# Patient Record
Sex: MALE | Race: OTHER | HISPANIC OR LATINO | URBAN - METROPOLITAN AREA
[De-identification: names, ages, dates, MRNs, and addresses within clinical notes are randomized per-mention and may not be internally consistent; named-entity substitution may affect disease eponyms.]

---

## 2022-08-30 ENCOUNTER — INPATIENT (INPATIENT)
Facility: HOSPITAL | Age: 47
LOS: 1 days | Discharge: HOME | DRG: 311 | End: 2022-09-01
Attending: STUDENT IN AN ORGANIZED HEALTH CARE EDUCATION/TRAINING PROGRAM | Admitting: HOSPITALIST
Payer: MEDICAID

## 2022-08-30 VITALS
OXYGEN SATURATION: 97 % | HEIGHT: 65 IN | WEIGHT: 160.06 LBS | DIASTOLIC BLOOD PRESSURE: 88 MMHG | TEMPERATURE: 98 F | HEART RATE: 77 BPM | RESPIRATION RATE: 16 BRPM | SYSTOLIC BLOOD PRESSURE: 142 MMHG

## 2022-08-30 DIAGNOSIS — I21.3 ST ELEVATION (STEMI) MYOCARDIAL INFARCTION OF UNSPECIFIED SITE: ICD-10-CM

## 2022-08-30 DIAGNOSIS — F10.10 ALCOHOL ABUSE, UNCOMPLICATED: ICD-10-CM

## 2022-08-30 DIAGNOSIS — I10 ESSENTIAL (PRIMARY) HYPERTENSION: ICD-10-CM

## 2022-08-30 DIAGNOSIS — R07.9 CHEST PAIN, UNSPECIFIED: ICD-10-CM

## 2022-08-30 DIAGNOSIS — I24.9 ACUTE ISCHEMIC HEART DISEASE, UNSPECIFIED: ICD-10-CM

## 2022-08-30 DIAGNOSIS — E87.2 ACIDOSIS: ICD-10-CM

## 2022-08-30 DIAGNOSIS — Z29.9 ENCOUNTER FOR PROPHYLACTIC MEASURES, UNSPECIFIED: ICD-10-CM

## 2022-08-30 DIAGNOSIS — R74.8 ABNORMAL LEVELS OF OTHER SERUM ENZYMES: ICD-10-CM

## 2022-08-30 DIAGNOSIS — R79.89 OTHER SPECIFIED ABNORMAL FINDINGS OF BLOOD CHEMISTRY: ICD-10-CM

## 2022-08-30 LAB
ALBUMIN SERPL ELPH-MCNC: 4.6 G/DL — SIGNIFICANT CHANGE UP (ref 3.3–5)
ALP SERPL-CCNC: 85 U/L — SIGNIFICANT CHANGE UP (ref 40–120)
ALT FLD-CCNC: 60 U/L — HIGH (ref 10–45)
ANION GAP SERPL CALC-SCNC: 15 MMOL/L — SIGNIFICANT CHANGE UP (ref 5–17)
APTT BLD: 28.2 SEC — SIGNIFICANT CHANGE UP (ref 27.5–35.5)
APTT BLD: 45.6 SEC — HIGH (ref 27.5–35.5)
APTT BLD: 47.4 SEC — HIGH (ref 27.5–35.5)
AST SERPL-CCNC: 55 U/L — HIGH (ref 10–40)
BASE EXCESS BLDV CALC-SCNC: 9.5 MMOL/L — HIGH (ref -2–3)
BASOPHILS # BLD AUTO: 0.04 K/UL — SIGNIFICANT CHANGE UP (ref 0–0.2)
BASOPHILS NFR BLD AUTO: 0.5 % — SIGNIFICANT CHANGE UP (ref 0–2)
BILIRUB SERPL-MCNC: 0.9 MG/DL — SIGNIFICANT CHANGE UP (ref 0.2–1.2)
BLD GP AB SCN SERPL QL: NEGATIVE — SIGNIFICANT CHANGE UP
BUN SERPL-MCNC: 19 MG/DL — SIGNIFICANT CHANGE UP (ref 7–23)
CA-I SERPL-SCNC: 1.18 MMOL/L — SIGNIFICANT CHANGE UP (ref 1.15–1.33)
CALCIUM SERPL-MCNC: 9.8 MG/DL — SIGNIFICANT CHANGE UP (ref 8.4–10.5)
CHLORIDE BLDV-SCNC: 95 MMOL/L — LOW (ref 96–108)
CHLORIDE SERPL-SCNC: 93 MMOL/L — LOW (ref 96–108)
CK MB BLD-MCNC: 1.1 % — SIGNIFICANT CHANGE UP (ref 0–3.5)
CK MB CFR SERPL CALC: 1.6 NG/ML — SIGNIFICANT CHANGE UP (ref 0–6.7)
CK SERPL-CCNC: 144 U/L — SIGNIFICANT CHANGE UP (ref 30–200)
CO2 BLDV-SCNC: 38 MMOL/L — HIGH (ref 22–26)
CO2 SERPL-SCNC: 30 MMOL/L — SIGNIFICANT CHANGE UP (ref 22–31)
CREAT SERPL-MCNC: 2.19 MG/DL — HIGH (ref 0.5–1.3)
EGFR: 37 ML/MIN/1.73M2 — LOW
EOSINOPHIL # BLD AUTO: 0.12 K/UL — SIGNIFICANT CHANGE UP (ref 0–0.5)
EOSINOPHIL NFR BLD AUTO: 1.4 % — SIGNIFICANT CHANGE UP (ref 0–6)
GAS PNL BLDV: 135 MMOL/L — LOW (ref 136–145)
GAS PNL BLDV: SIGNIFICANT CHANGE UP
GLUCOSE BLDV-MCNC: 115 MG/DL — HIGH (ref 70–99)
GLUCOSE SERPL-MCNC: 112 MG/DL — HIGH (ref 70–99)
HCO3 BLDV-SCNC: 36 MMOL/L — HIGH (ref 22–29)
HCT VFR BLD CALC: 40.9 % — SIGNIFICANT CHANGE UP (ref 39–50)
HCT VFR BLD CALC: 45.7 % — SIGNIFICANT CHANGE UP (ref 39–50)
HCT VFR BLDA CALC: 45 % — SIGNIFICANT CHANGE UP (ref 39–51)
HGB BLD CALC-MCNC: 15.1 G/DL — SIGNIFICANT CHANGE UP (ref 12.6–17.4)
HGB BLD-MCNC: 13.6 G/DL — SIGNIFICANT CHANGE UP (ref 13–17)
HGB BLD-MCNC: 15.2 G/DL — SIGNIFICANT CHANGE UP (ref 13–17)
IMM GRANULOCYTES NFR BLD AUTO: 0.4 % — SIGNIFICANT CHANGE UP (ref 0–1.5)
INR BLD: 1.04 RATIO — SIGNIFICANT CHANGE UP (ref 0.88–1.16)
LACTATE BLDV-MCNC: 3.4 MMOL/L — HIGH (ref 0.5–2)
LACTATE SERPL-SCNC: 1.2 MMOL/L — SIGNIFICANT CHANGE UP (ref 0.5–2)
LIDOCAIN IGE QN: 51 U/L — SIGNIFICANT CHANGE UP (ref 7–60)
LYMPHOCYTES # BLD AUTO: 2.7 K/UL — SIGNIFICANT CHANGE UP (ref 1–3.3)
LYMPHOCYTES # BLD AUTO: 31.6 % — SIGNIFICANT CHANGE UP (ref 13–44)
MAGNESIUM SERPL-MCNC: 2.4 MG/DL — SIGNIFICANT CHANGE UP (ref 1.6–2.6)
MCHC RBC-ENTMCNC: 30.4 PG — SIGNIFICANT CHANGE UP (ref 27–34)
MCHC RBC-ENTMCNC: 30.6 PG — SIGNIFICANT CHANGE UP (ref 27–34)
MCHC RBC-ENTMCNC: 33.3 GM/DL — SIGNIFICANT CHANGE UP (ref 32–36)
MCHC RBC-ENTMCNC: 33.3 GM/DL — SIGNIFICANT CHANGE UP (ref 32–36)
MCV RBC AUTO: 91.3 FL — SIGNIFICANT CHANGE UP (ref 80–100)
MCV RBC AUTO: 92 FL — SIGNIFICANT CHANGE UP (ref 80–100)
MONOCYTES # BLD AUTO: 1.04 K/UL — HIGH (ref 0–0.9)
MONOCYTES NFR BLD AUTO: 12.2 % — SIGNIFICANT CHANGE UP (ref 2–14)
NEUTROPHILS # BLD AUTO: 4.62 K/UL — SIGNIFICANT CHANGE UP (ref 1.8–7.4)
NEUTROPHILS NFR BLD AUTO: 53.9 % — SIGNIFICANT CHANGE UP (ref 43–77)
NRBC # BLD: 0 /100 WBCS — SIGNIFICANT CHANGE UP (ref 0–0)
NRBC # BLD: 0 /100 WBCS — SIGNIFICANT CHANGE UP (ref 0–0)
NT-PROBNP SERPL-SCNC: 110 PG/ML — SIGNIFICANT CHANGE UP (ref 0–300)
PCO2 BLDV: 56 MMHG — HIGH (ref 42–55)
PH BLDV: 7.42 — SIGNIFICANT CHANGE UP (ref 7.32–7.43)
PHOSPHATE SERPL-MCNC: 4.9 MG/DL — HIGH (ref 2.5–4.5)
PLATELET # BLD AUTO: 190 K/UL — SIGNIFICANT CHANGE UP (ref 150–400)
PLATELET # BLD AUTO: 236 K/UL — SIGNIFICANT CHANGE UP (ref 150–400)
PO2 BLDV: 37 MMHG — SIGNIFICANT CHANGE UP (ref 25–45)
POTASSIUM BLDV-SCNC: 3.2 MMOL/L — LOW (ref 3.5–5.1)
POTASSIUM SERPL-MCNC: 3.6 MMOL/L — SIGNIFICANT CHANGE UP (ref 3.5–5.3)
POTASSIUM SERPL-SCNC: 3.6 MMOL/L — SIGNIFICANT CHANGE UP (ref 3.5–5.3)
PROT SERPL-MCNC: 7.9 G/DL — SIGNIFICANT CHANGE UP (ref 6–8.3)
PROTHROM AB SERPL-ACNC: 12.1 SEC — SIGNIFICANT CHANGE UP (ref 10.5–13.4)
RBC # BLD: 4.48 M/UL — SIGNIFICANT CHANGE UP (ref 4.2–5.8)
RBC # BLD: 4.97 M/UL — SIGNIFICANT CHANGE UP (ref 4.2–5.8)
RBC # FLD: 13.1 % — SIGNIFICANT CHANGE UP (ref 10.3–14.5)
RBC # FLD: 13.1 % — SIGNIFICANT CHANGE UP (ref 10.3–14.5)
RH IG SCN BLD-IMP: POSITIVE — SIGNIFICANT CHANGE UP
SAO2 % BLDV: 57.8 % — LOW (ref 67–88)
SARS-COV-2 RNA SPEC QL NAA+PROBE: SIGNIFICANT CHANGE UP
SODIUM SERPL-SCNC: 138 MMOL/L — SIGNIFICANT CHANGE UP (ref 135–145)
TROPONIN T, HIGH SENSITIVITY RESULT: 16 NG/L — SIGNIFICANT CHANGE UP (ref 0–51)
TROPONIN T, HIGH SENSITIVITY RESULT: 17 NG/L — SIGNIFICANT CHANGE UP (ref 0–51)
WBC # BLD: 6.98 K/UL — SIGNIFICANT CHANGE UP (ref 3.8–10.5)
WBC # BLD: 8.55 K/UL — SIGNIFICANT CHANGE UP (ref 3.8–10.5)
WBC # FLD AUTO: 6.98 K/UL — SIGNIFICANT CHANGE UP (ref 3.8–10.5)
WBC # FLD AUTO: 8.55 K/UL — SIGNIFICANT CHANGE UP (ref 3.8–10.5)

## 2022-08-30 PROCEDURE — 99221 1ST HOSP IP/OBS SF/LOW 40: CPT | Mod: GC

## 2022-08-30 PROCEDURE — 99291 CRITICAL CARE FIRST HOUR: CPT

## 2022-08-30 PROCEDURE — 93306 TTE W/DOPPLER COMPLETE: CPT | Mod: 26

## 2022-08-30 PROCEDURE — 93356 MYOCRD STRAIN IMG SPCKL TRCK: CPT

## 2022-08-30 PROCEDURE — 99223 1ST HOSP IP/OBS HIGH 75: CPT | Mod: GC

## 2022-08-30 PROCEDURE — 71045 X-RAY EXAM CHEST 1 VIEW: CPT | Mod: 26

## 2022-08-30 PROCEDURE — 76700 US EXAM ABDOM COMPLETE: CPT | Mod: 26

## 2022-08-30 RX ORDER — HEPARIN SODIUM 5000 [USP'U]/ML
INJECTION INTRAVENOUS; SUBCUTANEOUS
Qty: 25000 | Refills: 0 | Status: DISCONTINUED | OUTPATIENT
Start: 2022-08-30 | End: 2022-08-31

## 2022-08-30 RX ORDER — HEPARIN SODIUM 5000 [USP'U]/ML
4000 INJECTION INTRAVENOUS; SUBCUTANEOUS EVERY 6 HOURS
Refills: 0 | Status: DISCONTINUED | OUTPATIENT
Start: 2022-08-30 | End: 2022-08-31

## 2022-08-30 RX ORDER — HEPARIN SODIUM 5000 [USP'U]/ML
5000 INJECTION INTRAVENOUS; SUBCUTANEOUS ONCE
Refills: 0 | Status: COMPLETED | OUTPATIENT
Start: 2022-08-30 | End: 2022-08-30

## 2022-08-30 RX ORDER — TICAGRELOR 90 MG/1
180 TABLET ORAL ONCE
Refills: 0 | Status: COMPLETED | OUTPATIENT
Start: 2022-08-30 | End: 2022-08-30

## 2022-08-30 RX ORDER — ASPIRIN/CALCIUM CARB/MAGNESIUM 324 MG
81 TABLET ORAL DAILY
Refills: 0 | Status: DISCONTINUED | OUTPATIENT
Start: 2022-08-30 | End: 2022-09-01

## 2022-08-30 RX ORDER — KETOROLAC TROMETHAMINE 30 MG/ML
15 SYRINGE (ML) INJECTION ONCE
Refills: 0 | Status: DISCONTINUED | OUTPATIENT
Start: 2022-08-30 | End: 2022-08-30

## 2022-08-30 RX ORDER — TICAGRELOR 90 MG/1
90 TABLET ORAL EVERY 12 HOURS
Refills: 0 | Status: DISCONTINUED | OUTPATIENT
Start: 2022-08-30 | End: 2022-08-31

## 2022-08-30 RX ORDER — SODIUM CHLORIDE 9 MG/ML
1000 INJECTION INTRAMUSCULAR; INTRAVENOUS; SUBCUTANEOUS ONCE
Refills: 0 | Status: DISCONTINUED | OUTPATIENT
Start: 2022-08-30 | End: 2022-08-30

## 2022-08-30 RX ORDER — ASPIRIN/CALCIUM CARB/MAGNESIUM 324 MG
324 TABLET ORAL ONCE
Refills: 0 | Status: COMPLETED | OUTPATIENT
Start: 2022-08-30 | End: 2022-08-30

## 2022-08-30 RX ORDER — ATORVASTATIN CALCIUM 80 MG/1
80 TABLET, FILM COATED ORAL AT BEDTIME
Refills: 0 | Status: DISCONTINUED | OUTPATIENT
Start: 2022-08-30 | End: 2022-09-01

## 2022-08-30 RX ORDER — ONDANSETRON 8 MG/1
4 TABLET, FILM COATED ORAL ONCE
Refills: 0 | Status: DISCONTINUED | OUTPATIENT
Start: 2022-08-30 | End: 2022-08-30

## 2022-08-30 RX ORDER — HEPARIN SODIUM 5000 [USP'U]/ML
4000 INJECTION INTRAVENOUS; SUBCUTANEOUS ONCE
Refills: 0 | Status: DISCONTINUED | OUTPATIENT
Start: 2022-08-30 | End: 2022-08-30

## 2022-08-30 RX ADMIN — HEPARIN SODIUM 1300 UNIT(S)/HR: 5000 INJECTION INTRAVENOUS; SUBCUTANEOUS at 23:14

## 2022-08-30 RX ADMIN — ATORVASTATIN CALCIUM 80 MILLIGRAM(S): 80 TABLET, FILM COATED ORAL at 21:47

## 2022-08-30 RX ADMIN — TICAGRELOR 90 MILLIGRAM(S): 90 TABLET ORAL at 18:41

## 2022-08-30 RX ADMIN — HEPARIN SODIUM 5000 UNIT(S): 5000 INJECTION INTRAVENOUS; SUBCUTANEOUS at 07:23

## 2022-08-30 RX ADMIN — Medication 324 MILLIGRAM(S): at 06:46

## 2022-08-30 RX ADMIN — HEPARIN SODIUM 1150 UNIT(S)/HR: 5000 INJECTION INTRAVENOUS; SUBCUTANEOUS at 15:41

## 2022-08-30 RX ADMIN — TICAGRELOR 180 MILLIGRAM(S): 90 TABLET ORAL at 07:23

## 2022-08-30 RX ADMIN — Medication 81 MILLIGRAM(S): at 18:41

## 2022-08-30 RX ADMIN — HEPARIN SODIUM 1000 UNIT(S)/HR: 5000 INJECTION INTRAVENOUS; SUBCUTANEOUS at 08:20

## 2022-08-30 NOTE — H&P ADULT - NSHPLABSRESULTS_GEN_ALL_CORE
Personally reviewed labs, imaging, ekg                           15.2   8.55  )-----------( 236      ( 30 Aug 2022 06:51 )             45.7       08-30    138  |  93<L>  |  19  ----------------------------<  112<H>  3.6   |  30  |  2.19<H>    Ca    9.8      30 Aug 2022 06:51  Phos  4.9     08-30  Mg     2.4     08-30    TPro  7.9  /  Alb  4.6  /  TBili  0.9  /  DBili  x   /  AST  55<H>  /  ALT  60<H>  /  AlkPhos  85  08-30                  PT/INR - ( 30 Aug 2022 07:05 )   PT: 12.1 sec;   INR: 1.04 ratio         PTT - ( 30 Aug 2022 07:05 )  PTT:28.2 sec    Lactate Trend      CARDIAC MARKERS ( 30 Aug 2022 06:52 )  x     / x     / 144 U/L / x     / 1.6 ng/mL        CAPILLARY BLOOD GLUCOSE                Personal interpretation EKG:

## 2022-08-30 NOTE — H&P ADULT - ATTENDING COMMENTS
47M w/pmh HTN p/w chest pain. No STEMI on EKG, however will treat as NSTEMI. Cont heparin drip, aspirin, brilinta. F/u TTE. Cardiology recs appreciated. Also found to have AMY, unclear etiology. Will check urine studies and trend Cr.     Incidentally found to have hepatic steatosis, possibly cirrhosis. This is likely due to alcohol use - advised patient he needs to stop drinking to prevent further worsening of liver disease.

## 2022-08-30 NOTE — ED PROVIDER NOTE - PROGRESS NOTE DETAILS
Cardiology at bedside evaluating patient pettet attending- Discussed with cardiology will evaluate patient emergently wait for heparin drip aspirin given labs pending Cee Miranda, PGY-2, EM:  Per cards, they are unconvinced it is a STEMI. Said rpt ecg in 10 min and brillinta load. Cee Miranda, PGY-2, EM:  Per cards, they are unconvinced it is a STEMI. Said rpt ecg in 10 min and brillinta load. not cath candidate at this time. recc tele admission follow labs, cards fellow recc tte Lida Hopson PGY2: I received sign out on this patient. I have reassessed patient and agree with the current plan. Will follow-up labs/imaging. Patient got 2nd EKG also shows STEMI. Awaiting finally cards recs then patient TBA. Meds started. Lida Hopson PGY2: I received sign out on this patient. I have reassessed patient and agree with the current plan. Will follow-up labs/imaging. Patient got 2nd EKG also shows STEMI. Awaiting finally cards recs then patient TBA. Meds started. Mild bump in LFT, Cr 2.19 (no old in system), will get RUQ ultrasound. Patient TBA to medicine tele.

## 2022-08-30 NOTE — H&P ADULT - NSHPREVIEWOFSYSTEMS_GEN_ALL_CORE
Review of Systems:     CONSTITUTIONAL: No fever, weight loss  EYES: No eye pain, visual disturbances, or discharge  ENMT:  No difficulty hearing, tinnitus, vertigo; No sinus or throat pain  RESPIRATORY: No SOB. No cough, wheezing, chills or hemoptysis  CARDIOVASCULAR: +chest pain, palpitations, dizziness, or leg swelling  GASTROINTESTINAL: No abdominal or epigastric pain. +nausea, vomiting, or hematemesis; No diarrhea or constipation. No melena or hematochezia.  GENITOURINARY: No dysuria, frequency, hematuria, or incontinence  NEUROLOGICAL: No headaches, memory loss, loss of strength, numbness, or tremors  SKIN: No itching, burning, rashes, or lesions   LYMPH NODES: No enlarged glands  ENDOCRINE: No heat or cold intolerance; No hair loss  MUSCULOSKELETAL: No joint pain or swelling; No muscle, back pain  PSYCHIATRIC: No depression, anxiety, mood swings, or difficulty sleeping  HEME/LYMPH: No easy bruising, or bleeding gums Review of Systems:     CONSTITUTIONAL: No fever, weight loss  EYES: No eye pain, visual disturbances, or discharge  ENMT:  No difficulty hearing, tinnitus, vertigo; No sinus or throat pain  RESPIRATORY: No SOB. No cough, wheezing, chills or hemoptysis  CARDIOVASCULAR: +chest pain, palpitations, dizziness, or leg swelling  GASTROINTESTINAL: No abdominal or epigastric pain. +nausea, vomiting, no hematemesis; No diarrhea or constipation. No melena or hematochezia.  GENITOURINARY: No dysuria, frequency, hematuria, or incontinence  NEUROLOGICAL: No headaches, memory loss, loss of strength, numbness, or tremors  SKIN: No itching, burning, rashes, or lesions   LYMPH NODES: No enlarged glands  ENDOCRINE: No heat or cold intolerance; No hair loss  MUSCULOSKELETAL: No joint pain or swelling; No muscle, back pain  PSYCHIATRIC: No depression, anxiety, mood swings, or difficulty sleeping  HEME/LYMPH: No easy bruising, or bleeding gums

## 2022-08-30 NOTE — H&P ADULT - PROBLEM SELECTOR PLAN 1
Patient with typical chest pain at rest, unstable angina  Trop in ED 21 --> 17   Given ASA loading dose with CP relief, currently pain free Patient with typical chest pain at rest, unstable angina  Trop in ED 21 --> 17   Given ASA loading dose with CP relief, currently pain free  - c/w ASA 81mg   - c/w brilinta 90mg BID   - c/w heparin ggt  - cardiology following   - pending TTE ACS rule out   Patient with typical chest pain at rest, unstable angina  Per cards, EKG with LVH, ST-elevation in v1-3 consistent with early repolarization, ST-depressions in I, aVL, v5-6  Trop in ED 21 --> 17 --> 16   Given ASA loading dose with CP relief, currently pain free  - c/w ASA 81mg   - c/w brilinta 90mg BID   - c/w heparin ggt  - cardiology following   - pending TTE

## 2022-08-30 NOTE — H&P ADULT - NSICDXFAMILYHX_GEN_ALL_CORE_FT
FAMILY HISTORY:  Mother  Still living? Unknown  Family history of stroke, Age at diagnosis: Age Unknown    Sibling  Still living? Unknown  Family history of stroke, Age at diagnosis: Age Unknown

## 2022-08-30 NOTE — H&P ADULT - PROBLEM SELECTOR PLAN 5
VTE ppx: heparin ggt Cr on BMP 2.19  BUN: Cr = 8.7, do not have a baseline Cr to determine a baseline. Pattern suggests intra-renal pathology   - monitor following BMP   - avoid nephrotoxic agents   - can order urine creatinine and sodium if not improving LA on VBG is 3.4 LA on VBG is 3.4  Patient has not been hypotensive, perfusing well with strong distal pulses   - will order stat lactate and check again AM

## 2022-08-30 NOTE — CONSULT NOTE ADULT - SUBJECTIVE AND OBJECTIVE BOX
CARDIOLOGY CONSULT INITIAL EVALUATION  NOLAN MOTA  MRN-[id number]    CONSULTING SERVICE: Emergency Medicine  CONSULT QUESTION: Chest pain    HPI:  47M w/ hx of HTN, presented with chest pain. He reports for last 3-4 years has had intermittent chest pain with exertion that improves with rest. The chest pain was located in his L and R chest, non-radiating. For last 2-3 days he has had nausea with non-bloody emesis. On day of presentation he started having the same chest pain at approximately 3pm however it persisted even at rest. Due to the pain he presented to the ED for further care. Denies associated sob, cough, palpitations, syncope.    In ED pt received aspirin load, reports chest pain improving. After initial EKG with possible HERMINIO, STEMI activated. After evaluation (see separate STEMI note), EKG changes did not meet criteria for STEMI, cath lab not activated emergently. After aspirin load, brilinta load, and hep gtt, pt reported chest pain completely resolved.    ROS:  Negative, except as above.    PAST MEDICAL & SURGICAL HISTORY:  Hypertension        Prescriptions:    Home Medications:    MEDICATIONS  (STANDING):  heparin  Infusion.  Unit(s)/Hr (10 mL/Hr) IV Continuous <Continuous>    MEDICATIONS  (PRN):  heparin   Injectable 4000 Unit(s) IV Push every 6 hours PRN For aPTT less than 40    Allergies    No Known Allergies    Intolerances      FAMILY HISTORY:    Social History:    P/E:  Vital Signs Last 24 Hrs  T(C): 36.7 (30 Aug 2022 07:37), Max: 36.9 (30 Aug 2022 00:26)  T(F): 98.1 (30 Aug 2022 07:37), Max: 98.4 (30 Aug 2022 00:26)  HR: 57 (30 Aug 2022 07:37) (57 - 77)  BP: 108/66 (30 Aug 2022 07:37) (108/66 - 142/88)  BP(mean): --  RR: 18 (30 Aug 2022 07:37) (16 - 18)  SpO2: 97% (30 Aug 2022 07:37) (97% - 98%)    Parameters below as of 30 Aug 2022 07:37  Patient On (Oxygen Delivery Method): room air      Daily Height in cm: 165.1 (30 Aug 2022 00:26)    Daily   I&O's Summary    - gen: well appearing, laying in hospital bed, NAD  - HEENT: MMM, NCAT  - neck: no JVD, supple  - heart: RRR, nml S1/S2, no m/r/g  - lungs: CTABL, nml WOB, no w/c/r  - abd: soft, NTND, no r/g  - ext: WWP, no LE edema b/l    RELEVANT RECENT LABS/IMAGING/STUDIES:                        15.2   8.55  )-----------( 236      ( 30 Aug 2022 06:51 )             45.7     08-30    138  |  93<L>  |  19  ----------------------------<  112<H>  3.6   |  30  |  2.19<H>    Ca    9.8      30 Aug 2022 06:51  Phos  4.9     08-30  Mg     2.4     08-30    TPro  7.9  /  Alb  4.6  /  TBili  0.9  /  DBili  x   /  AST  55<H>  /  ALT  60<H>  /  AlkPhos  85  08-30    LIVER FUNCTIONS - ( 30 Aug 2022 06:51 )  Alb: 4.6 g/dL / Pro: 7.9 g/dL / ALK PHOS: 85 U/L / ALT: 60 U/L / AST: 55 U/L / GGT: x           PT/INR - ( 30 Aug 2022 07:05 )   PT: 12.1 sec;   INR: 1.04 ratio         PTT - ( 30 Aug 2022 07:05 )  PTT:28.2 sec    Troponin: 21    --------------------------------------  EK/30 6:24am: NSR, normal axis, QTc 480, J point elevation in V1-3 with concave-up ST changes

## 2022-08-30 NOTE — ED ADULT NURSE REASSESSMENT NOTE - NS ED NURSE REASSESS COMMENT FT1
Received pt on stretcher awake. Pt is OAx4. breathing unlabored on RA symmetrical rise and fall of the chest. Skin is warm and dry to touch. Pt states that he feels ok no pain. Will continue to monitor.

## 2022-08-30 NOTE — ED PROVIDER NOTE - PHYSICAL EXAMINATION
Physical Exam:  Gen: NAD, AOx3, non-toxic appearing  Head: normal appearing  HEENT: normal conjunctiva, oral mucosa moist  Lung:  no respiratory distress, speaking in full sentences, clear to ascultation bilaterally     CV: regular rate and rhythm   Abd: soft, mildly distended, NT  MSK: no visible deformities  Neuro: No focal deficits  Skin: Warm  Psych: normal affect  ~Tomer Katz D.O. -ED Attending

## 2022-08-30 NOTE — ED PROVIDER NOTE - ATTENDING CONTRIBUTION TO CARE
I, Tomer Katz, performed a history and physical exam of the patient and discussed their management with the resident and/or advanced care provider. I reviewed the resident and/or advanced care provider's note and agree with the documented findings and plan of care. I was present and available for all procedures.    See my full note for details

## 2022-08-30 NOTE — H&P ADULT - PROBLEM SELECTOR PLAN 6
VTE ppx: heparin ggt Binge drinking at home 12 beers, last drink 5 days ago   Patient denies hx of withdrawal, seizures  Outside of withdrawal window, will hold off on CIWA  - SBIRT consult

## 2022-08-30 NOTE — H&P ADULT - PROBLEM SELECTOR PLAN 4
AST 55, ALT 60  U/S abd 8/30: Hepatic steatosis, possible cirrhosis, no evidence of gallbladder or biliary disease  Patient w/o thrombocytopenia, coagulopathy, jaundice, volume overload AST 55, ALT 60  U/S abd 8/30: Hepatic steatosis, possible cirrhosis, no evidence of gallbladder or biliary disease  May be secondary to alcoholic liver disease   Patient w/o thrombocytopenia, coagulopathy, jaundice, volume overload  - follow up CMP Cr on BMP 2.19  BUN: Cr = 8.7, do not have a baseline Cr to determine a baseline. Pattern suggests intra-renal pathology.   - monitor following BMP   - avoid nephrotoxic agents   - f/u urine creatinine and sodium if not improving Cr on BMP 2.19  BUN: Cr = 8.7, do not have a baseline Cr to determine a baseline. Pattern suggests intra-renal pathology.   - monitor Cr following BMP   - avoid nephrotoxic agents   - f/u urine creatinine and sodium

## 2022-08-30 NOTE — H&P ADULT - NSHPPHYSICALEXAM_GEN_ALL_CORE
LOS:     VITALS:   T(C): 36.7 (08-30-22 @ 10:36), Max: 36.9 (08-30-22 @ 00:26)  HR: 66 (08-30-22 @ 10:36) (57 - 77)  BP: 125/72 (08-30-22 @ 10:36) (108/66 - 142/88)  RR: 18 (08-30-22 @ 10:36) (16 - 18)  SpO2: 97% (08-30-22 @ 10:36) (97% - 98%)    GENERAL: NAD, lying in bed comfortably  HEAD:  Atraumatic, Normocephalic  EYES: EOMI, PERRLA, conjunctiva and sclera clear  ENT: Moist mucous membranes  NECK: Supple, No JVD  CHEST/LUNG: Clear to auscultation bilaterally; No rales, rhonchi, wheezing, or rubs. Unlabored respirations  HEART: Regular rate and rhythm; No murmurs, rubs, or gallops  ABDOMEN: BSx4; Soft, nontender, nondistended  EXTREMITIES:  2+ Peripheral Pulses, brisk capillary refill. No clubbing, cyanosis, or edema  NERVOUS SYSTEM:  A&Ox3, no focal deficits   SKIN: No rashes or lesions

## 2022-08-30 NOTE — ED PROVIDER NOTE - OBJECTIVE STATEMENT
desmond attending- 47-year-old male past medical history of hypertension and alcohol abuse presents with chest pain abdominal pain.  Reports having abdominal pain nausea for last day reports pain feels like gnawing pain in chest.  Denies drug or alcohol use recently denies withdrawal symptoms.  Denies cocaine use denies recent travel or chest wall trauma.  Denies shortness of breath has not taken aspirin today

## 2022-08-30 NOTE — CONSULT NOTE ADULT - ATTENDING COMMENTS
The patient was seen and examined with the Cardiology Consultation Teaching Service.   Evaluated using a [place] telephone     He is a 47-year-old man with hypertension who presented after experiencing persistent chest pain on the day of admission.    The patient reports that he has had intermittent chest pain, particularly with heavy lifting for the last several years. Several days ago, he started to experiencing nausea and vomiting, and developed more persistent chest pain that did not subside.    No dyspnea, orthopnea or PND  No palpitations or dizziness    Comfortable-appearing man in no acute distress  Alert and oriented  Afebrile  Vital signs stable  JVP is not elevated  No carotid bruits  Clear lungs  Normal heart sounds  Soft, non-tender, non-distended abdomen  Extremities are warm and perfused  No peripheral edema     Normal blood counts  Normal coagulation  GFR 37  Mild transaminitis    Hs-troponin 21    Family history of CVA; no history of premature coronary disease    Previously worked as a cook in a restaurant  Non-smoker  Drinks >2 drinks although no every day  Denies drug use    ECG demonstrates sinus rhythm, LVH, ST-elevation in v1-3 consistent with early repolarization, ST-depressions in I, aVL, v5-6  CXR demonstrates clear lungs    Impression and Recommendations:  47-year-old man with hypertension who presented after experiencing persistent chest pain on the day of admission.    The patient's ongoing chest pain for the last several years is suggestive of a stable angina syndrome, which increases my suspicion for an acute event. The ECG is consistent with LVH and secondary ST changes, although this does not exclude the presence of ischemia. Initial cardiac biomarkers are unremarkable.     Given the unclear clinical picture and his low risk for bleeding complications, treatment for acute coronary syndrome with dual antiplatelet therapy and unfractionated heparin was initiated and should be continued.    Please obtain echocardiography and continue to trend cardiac biomarkers.  Start high-dose high-potency statin.     If the patient's cardiac biomarkers remain normal and his echocardiography is unremarkable, will likely plan for further non-invasive testing.     Dre Demarco MD  Cardiology  [phone]

## 2022-08-30 NOTE — CHART NOTE - NSCHARTNOTEFT_GEN_A_CORE
Registration Time: per ED notes  Initial EC:24AM  Called by ED: 6:31AM  Saw patient at bedside: 6:36AM  Called Cath Attendin:45AM    HPI:  47M w/ hx of HTN, presented with chest pain. He reports for last 3-4 years has had intermittent chest pain with exertion that improves with rest. The chest pain was located in his L and R chest, non-radiating. For last 2-3 days he has had nausea with non-bloody emesis. On day of presentation he started having the same chest pain at approximately 3pm however it persisted even at rest. Due to the pain he presented to the ED for further care. Denies associated sob, cough, palpitations, syncope.    In ED pt received aspirin load, reports chest pain improving.    PMHx:   Hypertension        PSHx:       Home Meds:    Current Meds:   heparin   Injectable 4000 Unit(s) IV Push every 6 hours PRN  heparin   Injectable 5000 Unit(s) IV Push once  heparin  Infusion.  Unit(s)/Hr IV Continuous <Continuous>  ticagrelor 180 milliGRAM(s) Oral Once      Allergies:  No Known Allergies      FAMILY HISTORY:      Social History:  Smoking History:  Alcohol Use:  Drug Use:    All other review of systems is negative unless indicated above.    Physical Exam:  T(F): 98.4 (08-30), Max: 98.4 (08-30)  HR: 65 (08-30) (65 - 77)  BP: 136/91 (08-30) (136/91 - 142/88)  RR: 18 (08-30)  SpO2: 98% (08-30)  GENERAL: No acute distress, well-developed  HEAD:  Atraumatic, Normocephalic  ENT: EOMI, No JVD, moist mucosa  CHEST/LUNG: Clear to auscultation bilaterally; No wheeze, equal breath sounds bilaterally   HEART: Regular rate and rhythm; No murmurs, rubs, or gallops  ABDOMEN: Soft, Nontender, Nondistended; Bowel sounds present  EXTREMITIES:  No clubbing, cyanosis, or edema      ECG: Personally reviewed      Labs: Pending    A/P:  Discussed the patient's presentation and presenting EKG with interventional on-call attending. Currently the EKG does not meet STEMI criteria, recommend empiric ACS treatment with brilinta load, hep gtt ACS protocol, full labs including troponin, repeating EKG in 10 minutes, TTE. Cardiology consulting service will continue to follow.

## 2022-08-30 NOTE — H&P ADULT - ASSESSMENT
Patient is a 48 y/o M with PMH HTN who presents with chest pain, treated medically for ACS per cardiology.  Patient is a 46 y/o M with PMH HTN, former smoker, and alcohol misuse who presents with chest pain, treated medically for ACS per cardiology.  Patient is a 48 y/o M with PMH HTN, former smoker, alcohol misuse, and stable angina who presents with chest pain persistent at rest, treated medically for ACS per cardiology.

## 2022-08-30 NOTE — ED ADULT NURSE REASSESSMENT NOTE - NS ED NURSE REASSESS COMMENT FT1
voided in br and returned to bed; heparin gtt at 11.5 cc per hour via pump to peripheral site; no c/o cp or nausea; tele box # 24 in place; skin warm and dry; no acute distress; await bed assignment.

## 2022-08-30 NOTE — ED PROVIDER NOTE - CLINICAL SUMMARY MEDICAL DECISION MAKING FREE TEXT BOX
pettet attending 46yo Male presenting with chest pain and abdominal pain concern for ACS versus intra-abdominal etiology but without abdominal tenderness to palpation will work-up with screening labs troponin EKG chest x-ray as well as antiemetics possible abdominal imaging if remains with pain despite a normal exam.  EKG completed during my assessment showing now a STEMI anteriorly, aspirin given cardiology fellow consulted will emergently evaluate patient

## 2022-08-30 NOTE — H&P ADULT - PROBLEM SELECTOR PLAN 2
BP 140s systolic BP 140s systolic  - unable to obtain med rec BP 140s systolic  BP currently 110-120s, can hold off for now   - unable to obtain med rec as spouse is at work   - will contact tomorrow AM

## 2022-08-30 NOTE — CONSULT NOTE ADULT - ASSESSMENT
47M w/ hx of HTN, several years of chronic intermittent typical chest pain, presenting with an acute episode of chest pain. S/p STEMI call, discussed with on-call interventional cardiologist, EKG does not meet STEMI criteria. Given risk factors and typical nature of chest pain, will treat empirically for type 1 NSTEMI and obtain further work-up with labs and TTE. Pt currently chest pain free and HDS.    Recs:  - s/p aspirin load, brilinta load  - continue hep gtt  - continue aspirin 81mg daily  - continue brilinta 90mg BID  - start atorvastatin 80mg daily  - check A1c, lipids  - TTE  - telemetry  - obtain second troponin for trend    Dagoberto Quiles MD  Cardiology Fellow - PGY4    For all New Consults and Questions:  [email or web address]   Login: cardfellows    *** Recommendations are preliminary until cosigned by the attending. 47M w/ hx of HTN, several years of chronic intermittent typical chest pain, presenting with an acute episode of chest pain. S/p STEMI call, discussed with on-call interventional cardiologist, EKG does not meet STEMI criteria. Given risk factors and typical nature of chest pain, will treat empirically for ACS/UA/type 1 NSTEMI and obtain further work-up with labs and TTE. Pt currently chest pain free and HDS.    Recs:  - s/p aspirin load, brilinta load  - continue hep gtt  - continue aspirin 81mg daily  - continue brilinta 90mg BID  - start atorvastatin 80mg daily  - check A1c, lipids  - TTE  - telemetry  - obtain second troponin for trend    Dagoberto Quiles MD  Cardiology Fellow - PGY4    For all New Consults and Questions:  [email or web address]   Login: cardfellows    *** Recommendations are preliminary until cosigned by the attending.

## 2022-08-30 NOTE — H&P ADULT - HISTORY OF PRESENT ILLNESS
Patient is a 48 y/o M with PMH HTN who presents with chest pain. Reports that chest pain started about 2 years ago and is intermittent in nature, worsened with exertion and relieved with rest. Describes the pain as a pressure that is bilateral in the chest but not radiating to the arm or jaw. Typically will last <5 minutes and takes tylenol which relieves the pain typically. He reports upset stomach, nausea and NBNB vomiting over the past 2 days before presenting to the ED. During the episodes of emesis would have increased sweating, coughing and shortness of breath. On day of presentation, he had similar chest pain that started while walking and persisted even at rest. Of note, patient's mother and brother had CVA at 45 and 50 respectively. He denies cardiac history in family members, but some with hypertension.     In the ED:   Received ASA 324mg with resolution of chest pain, as well as brilinta 180mg    S/p heparin 5000 IV push, currently on heparin ggt   After initial EKG with possible HERMINIO, STEMI activated. After evaluation (see separate STEMI note), EKG changes did not meet criteria for STEMI, cath lab not activated emergently Patient is a 48 y/o M with PMH HTN who presents with chest pain. Reports that chest pain started about 2 years ago and is intermittent in nature, worsened with exertion and relieved with rest. Describes the pain as a pressure that is bilateral in the chest but not radiating to the arm or jaw. Typically will last <5 minutes and takes tylenol which relieves the pain typically. He reports nausea and NBNB vomiting over the past 2 days before presenting to the ED. During the episodes of emesis would have increased sweating, coughing and shortness of breath. On day of presentation, he had similar chest pain that started while walking and persisted even at rest. Of note, patient's mother and brother had CVA at 45 and 50 respectively. He denies cardiac history in family members, but some with hypertension.     In the ED:   Received ASA 324mg with resolution of chest pain, as well as brilinta 180mg    S/p heparin 5000 IV push, currently on heparin ggt   After initial EKG with possible HERMINIO, STEMI activated. After evaluation (see separate STEMI note), EKG changes did not meet criteria for STEMI, cath lab not activated emergently

## 2022-08-30 NOTE — ED PROVIDER NOTE - NS ED ROS FT
ROS:  GENERAL: No fever, no chills  EYES: no change in vision  HEENT: no trouble swallowing, no trouble speaking  CARDIAC: +chest pain  PULMONARY: no cough, no shortness of breath  GI: + abdominal pain, no nausea, no vomiting, no diarrhea, no constipation  : No dysuria, no frequency, no change in appearance, or odor of urine  SKIN: no rashes  NEURO: no headache, no weakness  MSK: No joint pain  ~Tomer Katz D.O. -ED Attending

## 2022-08-30 NOTE — H&P ADULT - PROBLEM SELECTOR PLAN 3
Binge drinking at home 12 beers, last drink 5 days ago   Patient denies hx of withdrawal, seizures Binge drinking at home 12 beers, last drink 5 days ago   Patient denies hx of withdrawal, seizures  - consider symptom triggered CIWA AST 55, ALT 60  U/S abd 8/30: Hepatic steatosis, possible cirrhosis, no evidence of gallbladder or biliary disease  May be secondary to alcoholic liver disease   Patient w/o thrombocytopenia, coagulopathy, jaundice, volume overload  - hepatitis panel pending   - follow up CMP

## 2022-08-30 NOTE — ED ADULT NURSE NOTE - OBJECTIVE STATEMENT
46 y/o male with not PMH arrives to the ER ambulatory complaining of abdominal pain. Pt is A&Ox4, speaking coherently, states having inability to tolerate PO  intake for the last 2 days, endorses nausea and vomiting after eating. Additionally complaints pain of diffuse abdominal pain and headache. Pt denies SOB, chest pain, dizziness, diarrhea, urinary symptoms, fevers, chills.  On assessment airway is patent, breathing spontaneously and unlabored. Skin is dry, warm and color appropriate to race.  Abdomen is soft, no distended, no tender. Full ROM in all extremities. Comfort and safety provided, side rails up with bed locked and in lowest position for safety. call bell within reach. [place] provided.  will continue to reassess.

## 2022-08-31 LAB
A1C WITH ESTIMATED AVERAGE GLUCOSE RESULT: 6 % — HIGH (ref 4–5.6)
ALBUMIN SERPL ELPH-MCNC: 4.4 G/DL — SIGNIFICANT CHANGE UP (ref 3.3–5)
ALP SERPL-CCNC: 85 U/L — SIGNIFICANT CHANGE UP (ref 40–120)
ALT FLD-CCNC: 77 U/L — HIGH (ref 10–45)
ANION GAP SERPL CALC-SCNC: 15 MMOL/L — SIGNIFICANT CHANGE UP (ref 5–17)
APTT BLD: 73 SEC — HIGH (ref 27.5–35.5)
AST SERPL-CCNC: 99 U/L — HIGH (ref 10–40)
BASOPHILS # BLD AUTO: 0.07 K/UL — SIGNIFICANT CHANGE UP (ref 0–0.2)
BASOPHILS NFR BLD AUTO: 0.9 % — SIGNIFICANT CHANGE UP (ref 0–2)
BILIRUB SERPL-MCNC: 0.8 MG/DL — SIGNIFICANT CHANGE UP (ref 0.2–1.2)
BUN SERPL-MCNC: 20 MG/DL — SIGNIFICANT CHANGE UP (ref 7–23)
CALCIUM SERPL-MCNC: 9.4 MG/DL — SIGNIFICANT CHANGE UP (ref 8.4–10.5)
CHLORIDE SERPL-SCNC: 96 MMOL/L — SIGNIFICANT CHANGE UP (ref 96–108)
CHOLEST SERPL-MCNC: 240 MG/DL — HIGH
CO2 SERPL-SCNC: 26 MMOL/L — SIGNIFICANT CHANGE UP (ref 22–31)
CREAT SERPL-MCNC: 1.19 MG/DL — SIGNIFICANT CHANGE UP (ref 0.5–1.3)
EGFR: 77 ML/MIN/1.73M2 — SIGNIFICANT CHANGE UP
EOSINOPHIL # BLD AUTO: 0.26 K/UL — SIGNIFICANT CHANGE UP (ref 0–0.5)
EOSINOPHIL NFR BLD AUTO: 3.3 % — SIGNIFICANT CHANGE UP (ref 0–6)
ESTIMATED AVERAGE GLUCOSE: 126 MG/DL — HIGH (ref 68–114)
GLUCOSE SERPL-MCNC: 112 MG/DL — HIGH (ref 70–99)
HAV IGM SER-ACNC: SIGNIFICANT CHANGE UP
HBV CORE IGM SER-ACNC: SIGNIFICANT CHANGE UP
HBV SURFACE AG SER-ACNC: SIGNIFICANT CHANGE UP
HCT VFR BLD CALC: 44.2 % — SIGNIFICANT CHANGE UP (ref 39–50)
HCV AB S/CO SERPL IA: 0.1 S/CO — SIGNIFICANT CHANGE UP (ref 0–0.99)
HCV AB SERPL-IMP: SIGNIFICANT CHANGE UP
HDLC SERPL-MCNC: 47 MG/DL — SIGNIFICANT CHANGE UP
HGB BLD-MCNC: 15.2 G/DL — SIGNIFICANT CHANGE UP (ref 13–17)
IMM GRANULOCYTES NFR BLD AUTO: 0.3 % — SIGNIFICANT CHANGE UP (ref 0–1.5)
LACTATE SERPL-SCNC: 1.7 MMOL/L — SIGNIFICANT CHANGE UP (ref 0.5–2)
LIPID PNL WITH DIRECT LDL SERPL: 126 MG/DL — HIGH
LYMPHOCYTES # BLD AUTO: 3.22 K/UL — SIGNIFICANT CHANGE UP (ref 1–3.3)
LYMPHOCYTES # BLD AUTO: 41.3 % — SIGNIFICANT CHANGE UP (ref 13–44)
MAGNESIUM SERPL-MCNC: 2.4 MG/DL — SIGNIFICANT CHANGE UP (ref 1.6–2.6)
MCHC RBC-ENTMCNC: 31.2 PG — SIGNIFICANT CHANGE UP (ref 27–34)
MCHC RBC-ENTMCNC: 34.4 GM/DL — SIGNIFICANT CHANGE UP (ref 32–36)
MCV RBC AUTO: 90.8 FL — SIGNIFICANT CHANGE UP (ref 80–100)
MONOCYTES # BLD AUTO: 0.82 K/UL — SIGNIFICANT CHANGE UP (ref 0–0.9)
MONOCYTES NFR BLD AUTO: 10.5 % — SIGNIFICANT CHANGE UP (ref 2–14)
NEUTROPHILS # BLD AUTO: 3.4 K/UL — SIGNIFICANT CHANGE UP (ref 1.8–7.4)
NEUTROPHILS NFR BLD AUTO: 43.7 % — SIGNIFICANT CHANGE UP (ref 43–77)
NON HDL CHOLESTEROL: 192 MG/DL — HIGH
NRBC # BLD: 0 /100 WBCS — SIGNIFICANT CHANGE UP (ref 0–0)
NT-PROBNP SERPL-SCNC: 30 PG/ML — SIGNIFICANT CHANGE UP (ref 0–300)
PHOSPHATE SERPL-MCNC: 4 MG/DL — SIGNIFICANT CHANGE UP (ref 2.5–4.5)
PLATELET # BLD AUTO: 212 K/UL — SIGNIFICANT CHANGE UP (ref 150–400)
POTASSIUM SERPL-MCNC: 3.3 MMOL/L — LOW (ref 3.5–5.3)
POTASSIUM SERPL-SCNC: 3.3 MMOL/L — LOW (ref 3.5–5.3)
PROT SERPL-MCNC: 7.4 G/DL — SIGNIFICANT CHANGE UP (ref 6–8.3)
RBC # BLD: 4.87 M/UL — SIGNIFICANT CHANGE UP (ref 4.2–5.8)
RBC # FLD: 13.1 % — SIGNIFICANT CHANGE UP (ref 10.3–14.5)
SODIUM SERPL-SCNC: 137 MMOL/L — SIGNIFICANT CHANGE UP (ref 135–145)
TRIGL SERPL-MCNC: 332 MG/DL — HIGH
WBC # BLD: 7.79 K/UL — SIGNIFICANT CHANGE UP (ref 3.8–10.5)
WBC # FLD AUTO: 7.79 K/UL — SIGNIFICANT CHANGE UP (ref 3.8–10.5)

## 2022-08-31 PROCEDURE — 99232 SBSQ HOSP IP/OBS MODERATE 35: CPT | Mod: GC

## 2022-08-31 RX ORDER — POTASSIUM CHLORIDE 20 MEQ
10 PACKET (EA) ORAL
Refills: 0 | Status: COMPLETED | OUTPATIENT
Start: 2022-08-31 | End: 2022-08-31

## 2022-08-31 RX ORDER — TICAGRELOR 90 MG/1
90 TABLET ORAL EVERY 12 HOURS
Refills: 0 | Status: DISCONTINUED | OUTPATIENT
Start: 2022-08-31 | End: 2022-09-01

## 2022-08-31 RX ORDER — POTASSIUM CHLORIDE 20 MEQ
20 PACKET (EA) ORAL
Refills: 0 | Status: DISCONTINUED | OUTPATIENT
Start: 2022-08-31 | End: 2022-08-31

## 2022-08-31 RX ORDER — ENOXAPARIN SODIUM 100 MG/ML
40 INJECTION SUBCUTANEOUS EVERY 24 HOURS
Refills: 0 | Status: DISCONTINUED | OUTPATIENT
Start: 2022-08-31 | End: 2022-09-01

## 2022-08-31 RX ADMIN — TICAGRELOR 90 MILLIGRAM(S): 90 TABLET ORAL at 17:50

## 2022-08-31 RX ADMIN — Medication 81 MILLIGRAM(S): at 11:11

## 2022-08-31 RX ADMIN — ATORVASTATIN CALCIUM 80 MILLIGRAM(S): 80 TABLET, FILM COATED ORAL at 21:49

## 2022-08-31 RX ADMIN — HEPARIN SODIUM 1300 UNIT(S)/HR: 5000 INJECTION INTRAVENOUS; SUBCUTANEOUS at 04:28

## 2022-08-31 RX ADMIN — HEPARIN SODIUM 1300 UNIT(S)/HR: 5000 INJECTION INTRAVENOUS; SUBCUTANEOUS at 07:15

## 2022-08-31 RX ADMIN — Medication 100 MILLIEQUIVALENT(S): at 12:22

## 2022-08-31 RX ADMIN — Medication 100 MILLIEQUIVALENT(S): at 15:14

## 2022-08-31 RX ADMIN — ENOXAPARIN SODIUM 40 MILLIGRAM(S): 100 INJECTION SUBCUTANEOUS at 11:11

## 2022-08-31 RX ADMIN — HEPARIN SODIUM 1300 UNIT(S)/HR: 5000 INJECTION INTRAVENOUS; SUBCUTANEOUS at 05:30

## 2022-08-31 RX ADMIN — Medication 100 MILLIEQUIVALENT(S): at 17:49

## 2022-08-31 RX ADMIN — TICAGRELOR 90 MILLIGRAM(S): 90 TABLET ORAL at 05:07

## 2022-08-31 NOTE — PROGRESS NOTE ADULT - ASSESSMENT
Patient is a 46 y/o M with PMH HTN, former smoker, alcohol misuse, and stable angina who presents with chest pain persistent at rest found to have downtrending troponins. TTE without wall motion abnormalities, currently followed by cardiology. No chest pain currently and hemodynamically stable.

## 2022-08-31 NOTE — PATIENT PROFILE ADULT - FALL HARM RISK - UNIVERSAL INTERVENTIONS
Bed in lowest position, wheels locked, appropriate side rails in place/Call bell, personal items and telephone in reach/Instruct patient to call for assistance before getting out of bed or chair/Non-slip footwear when patient is out of bed/[place] to call system/Physically safe environment - no spills, clutter or unnecessary equipment/Purposeful Proactive Rounding/Room/bathroom lighting operational, light cord in reach

## 2022-08-31 NOTE — PROGRESS NOTE ADULT - PROBLEM SELECTOR PLAN 4
Cr on BMP 2.19 --> 1.19   BUN: Cr = 8.7, do not have a baseline Cr to determine a baseline  Most likely pre-renal after improvement today   - monitor Cr following BMP   - avoid nephrotoxic agents   - f/u urine creatinine and sodium

## 2022-08-31 NOTE — PROGRESS NOTE ADULT - PROBLEM SELECTOR PLAN 2
BP 140s systolic  Spouse brought in meds, takes amlodipine 10mg and coreg 25 BID   BP currently 110 systolic, can hold off for now  - if pt becomes hypertensive can restart coreg home dose

## 2022-08-31 NOTE — PROGRESS NOTE ADULT - ASSESSMENT
47M w/ hx of HTN, several years of chronic intermittent typical chest pain, presenting with an acute episode of chest pain concerning for NSTEMI/UA, eccurrently chest pain free and HDS.    Recs:  -Chest pain free at this time. HStrop 21-->17-->16  - s/p aspirin load, brilinta load  -TTE with LVEF=70%, Severe concentric left ventricular hypertrophy. Normal diastolic function. Prominent atheroma in the aortic arch.   - can discontinue  hep gtt  - continue aspirin 81mg daily  - c/w atorvastatin 80mg daily  -Would obtain Nuclear stress test  - check A1c, lipids, TSH/ FT4  - monitor on telemetry  -If the patient has recurrent chest pain, please obtain repeat EKG, cardiac enzymes and call the cardiology fellow      Rojas Lama, Cardiology Fellow, F-1    For all New Consults and Questions:  [email or web address]   Login: [email or web address]    *** Recommendations are preliminary until cosigned by the attending.

## 2022-08-31 NOTE — PROGRESS NOTE ADULT - SUBJECTIVE AND OBJECTIVE BOX
PATIENT: NOLAN CHRISTIANSEN, MRN: [id number]    CHIEF COMPLAINT: Patient is a 47y old  Male who presents with a chief complaint of N/V, chest pain (31 Aug 2022 07:14)      INTERVAL HISTORY/OVERNIGHT EVENTS: No overnight events. Patient has been in sinus, chest pain free. Denies shortness of breath, lightheadedness, palpitations. Can ambulate without issues, no issues with BM.   REVIEW OF SYSTEMS:    Constitutional:     [x ] negative [ ] fevers [ ] chills [ ] weight loss [ ] weight gain  HEENT:                  [x ] negative [ ] dry eyes [ ] eye irritation [ ] postnasal drip [ ] nasal congestion  CV:                         [x ] negative  [ ] chest pain [ ] orthopnea [ ] palpitations [ ] murmur  Resp:                     [x ] negative [ ] cough [ ] shortness of breath [ ] dyspnea [ ] wheezing [ ] sputum [ ] hemoptysis  GI:                          [x ] negative [ ] nausea [ ] vomiting [ ] diarrhea [ ] constipation [ ] abd pain [ ] dysphagia   :                        [x ] negative [ ] dysuria [ ] nocturia [ ] hematuria [ ] increased urinary frequency  Musculoskeletal: [x ] negative [ ] back pain [ ] myalgias [ ] arthralgias [ ] fracture  Skin:                       [ x] negative [ ] rash [ ] itch  Neurological:        [x ] negative [ ] headache [ ] dizziness [ ] syncope [ ] weakness [ ] numbness  Psychiatric:           [x ] negative [ ] anxiety [ ] depression  Endocrine:            [x ] negative [ ] diabetes [ ] thyroid problem  Heme/Lymph:      [x ] negative [ ] anemia [ ] bleeding problem  Allergic/Immune: [x ] negative [ ] itchy eyes [ ] nasal discharge [ ] hives [ ] angioedema    MEDICATIONS:  MEDICATIONS  (STANDING):  aspirin  chewable 81 milliGRAM(s) Oral daily  atorvastatin 80 milliGRAM(s) Oral at bedtime  enoxaparin Injectable 40 milliGRAM(s) SubCutaneous every 24 hours  potassium chloride  10 mEq/100 mL IVPB 10 milliEquivalent(s) IV Intermittent every 1 hour    MEDICATIONS  (PRN):      ALLERGIES: Allergies    No Known Allergies    Intolerances        OBJECTIVE:  ICU Vital Signs Last 24 Hrs  T(C): 36.7 (31 Aug 2022 04:59), Max: 36.9 (30 Aug 2022 21:59)  T(F): 98.1 (31 Aug 2022 04:59), Max: 98.4 (30 Aug 2022 21:59)  HR: 65 (31 Aug 2022 04:59) (57 - 65)  BP: 111/74 (31 Aug 2022 04:59) (111/74 - 151/97)  BP(mean): --  ABP: --  ABP(mean): --  RR: 17 (31 Aug 2022 04:59) (17 - 18)  SpO2: 99% (31 Aug 2022 04:59) (99% - 100%)    O2 Parameters below as of 31 Aug 2022 04:59  Patient On (Oxygen Delivery Method): room air            I&O's Summary    Daily     Daily     PHYSICAL EXAMINATION:  General: Comfortable, no acute distress, cooperative with exam.  HEENT: PERRLA, EOMI, moist mucous membranes.  Respiratory: CTAB, normal respiratory effort, no coughing, wheezes, crackles, or rales.  CV: RRR, S1S2, no murmurs, rubs or gallops. No JVD. Distal pulses intact.  Abdominal: Soft, nontender, nondistended, no rebound or guarding, normal bowel sounds.  Neurology: AOx3, no focal neuro defects, CARRILLO x 4.  Extremities: No pitting edema, + Peripheral pulses.  Incisions:   Tubes:    LABS:                          15.2   7.79  )-----------( 212      ( 31 Aug 2022 05:07 )             44.2     08-31    137  |  96  |  20  ----------------------------<  112<H>  3.3<L>   |  26  |  1.19    Ca    9.4      31 Aug 2022 05:05  Phos  4.0     08-31  Mg     2.4     08-31    TPro  7.4  /  Alb  4.4  /  TBili  0.8  /  DBili  x   /  AST  99<H>  /  ALT  77<H>  /  AlkPhos  85  08-31    LIVER FUNCTIONS - ( 31 Aug 2022 05:05 )  Alb: 4.4 g/dL / Pro: 7.4 g/dL / ALK PHOS: 85 U/L / ALT: 77 U/L / AST: 99 U/L / GGT: x           PT/INR - ( 30 Aug 2022 07:05 )   PT: 12.1 sec;   INR: 1.04 ratio         PTT - ( 31 Aug 2022 05:07 )  PTT:73.0 sec    CARDIAC MARKERS ( 30 Aug 2022 06:52 )  x     / x     / 144 U/L / x     / 1.6 ng/mL        < from: Transthoracic Echocardiogram (08.30.22 @ 16:06) >  EF (Visual Estimate): 70 %  Doppler Peak Velocity (m/sec): AoV=1.5  ------------------------------------------------------------------------  Observations:  Mitral Valve: Normal mitral valve.  Aortic Valve/Aorta: Normal trileaflet aortic valve. Peak  transaortic valve gradient equals 10 mm Hg, mean  transaorticvalve gradient equals 6 mm Hg, aortic valve  velocity time integral equals 30 cm. Peak left ventricular  outflow tract gradient equals 5 mm Hg, mean gradient is  equal to 3 mm Hg, LVOT velocity time integral equals 25 cm.  Aortic Root: 3.1 cm.  LVOT diameter: 2.1 cm.  Prominent atheroma noted in the aortic arch.  Left Atrium: Normal left atrium.  LA volume index = 22  cc/m2.  Left Ventricle: Normal left ventricular systolic function.  No segmental wall motion abnormalities. Severe concentric  left ventricular hypertrophy. Normal diastolic function  Right Heart: Normal right atrium. Normal right ventricular  size and function. Normal tricuspid valve. Minimal  tricuspid regurgitation. Normal pulmonic valve. Mild  pulmonic regurgitation.  Pericardium/Pleura: Normal pericardium with no pericardial  effusion.  Hemodynamic: Estimated right atrial pressure is 8 mm Hg.  Estimated right ventricular systolic pressure equals 29 mm  Hg, assuming right atrial pressure equals 8 mm Hg,  consistent with normal pulmonary pressures.  ------------------------------------------------------------------------  Conclusions:  1. Aortic Root: 3.1 cm.  LVOT diameter: 2.1 cm.  Prominent atheroma noted in the aortic arch.  2. Severe concentric left ventricular hypertrophy.  3. Normal left ventricular systolic function. No segmental  wall motion abnormalities.  4. Global Longitudinal Strain -18.5% (Alicia, Hr 56 bpm,  /72)  5. Normal right ventricular size and function.  *** No previous Echo exam.    < end of copied text >

## 2022-08-31 NOTE — PROGRESS NOTE ADULT - PROBLEM SELECTOR PLAN 1
ACS rule out   Patient with typical chest pain at rest, unstable angina  Per cards, EKG with LVH, ST-elevation in v1-3 consistent with early repolarization, ST-depressions in I, aVL, v5-6  Trop in ED 21 --> 17 --> 16   Given ASA loading dose with CP relief, currently pain free  TTE: EF 70% with severe concentric LVH, no systolic dysfunction or wall motion abnormalities   In sinus rhythm   - HA1C 6, lipid panel with elevated , cholesterol and triglycerides elevated   - c/w ASA 81mg   - c/w atorvastatin 80mg   - d/c heparin ggt   - f/u cardiology recommendations

## 2022-08-31 NOTE — PROGRESS NOTE ADULT - SUBJECTIVE AND OBJECTIVE BOX
Patient seen and examined at bedside.    Overnight Events: No acute events overnight. Denies chest pain or SOB      REVIEW OF SYSTEMS:  Constitutional:     [ x] negative [ ] fevers [ ] chills [ ] weight loss [ ] weight gain  HEENT:                  [ x] negative [ ] dry eyes [ ] eye irritation [ ] postnasal drip [ ] nasal congestion  CV:                         [x ] negative  [ ] chest pain [ ] orthopnea [ ] palpitations [ ] murmur  Resp:                     [ x] negative [ ] cough [ ] shortness of breath [ ] dyspnea [ ] wheezing [ ] sputum [ ]hemoptysis  GI:                          [ x] negative [ ] nausea [ ] vomiting [ ] diarrhea [ ] constipation [ ] abd pain [ ] dysphagia   :                        [ x] negative [ ] dysuria [ ] nocturia [ ] hematuria [ ] increased urinary frequency  Musculoskeletal: [ x] negative [ ] back pain [ ] myalgias [ ] arthralgias [ ] fracture  Skin:                       [ x] negative [ ] rash [ ] itch  Neurological:        [ x] negative [ ] headache [ ] dizziness [ ] syncope [ ] weakness [ ] numbness  Psychiatric:           [ x] negative [ ] anxiety [ ] depression  Endocrine:            [ x] negative [ ] diabetes [ ] thyroid problem  Heme/Lymph:      [ x] negative [ ] anemia [ ] bleeding problem  Allergic/Immune: [x ] negative [ ] itchy eyes [ ] nasal discharge [ ] hives [ ] angioedema    [x ] All other systems negative  [ ] Unable to assess ROS due to    Current Meds:  aspirin  chewable 81 milliGRAM(s) Oral daily  atorvastatin 80 milliGRAM(s) Oral at bedtime  heparin   Injectable 4000 Unit(s) IV Push every 6 hours PRN  heparin  Infusion.  Unit(s)/Hr IV Continuous <Continuous>  ticagrelor 90 milliGRAM(s) Oral every 12 hours      PAST MEDICAL & SURGICAL HISTORY:  Hypertension      Alcohol dependence          Vitals:  T(F): 98.1 (08-31), Max: 98.4 (08-30)  HR: 65 (08-31) (57 - 66)  BP: 111/74 (08-31) (108/66 - 151/97)  RR: 17 (08-31)  SpO2: 99% (08-31)  I&O's Summary      Physical Exam:  - gen: well appearing, laying in hospital bed, NAD  - HEENT: MMM, NCAT  - neck: no JVD, supple  - heart: RRR, nml S1/S2, no m/r/g  - lungs: CTABL, nml WOB, no w/c/r  - abd: soft, NTND, no r/g  - ext: WWP, no LE edema b/l                            15.2   7.79  )-----------( 212      ( 31 Aug 2022 05:07 )             44.2     08-31    137  |  96  |  20  ----------------------------<  112<H>  3.3<L>   |  26  |  1.19    Ca    9.4      31 Aug 2022 05:05  Phos  4.0     08-31  Mg     2.4     08-31    TPro  7.4  /  Alb  4.4  /  TBili  0.8  /  DBili  x   /  AST  99<H>  /  ALT  77<H>  /  AlkPhos  85  08-31    PT/INR - ( 30 Aug 2022 07:05 )   PT: 12.1 sec;   INR: 1.04 ratio         PTT - ( 31 Aug 2022 05:07 )  PTT:73.0 sec  CARDIAC MARKERS ( 30 Aug 2022 06:52 )  x     / x     / 144 U/L / x     / 1.6 ng/mL      Serum Pro-Brain Natriuretic Peptide: 30 pg/mL (08-31 @ 05:05)  Serum Pro-Brain Natriuretic Peptide: 110 pg/mL (08-30 @ 11:37)

## 2022-09-01 ENCOUNTER — TRANSCRIPTION ENCOUNTER (OUTPATIENT)
Age: 47
End: 2022-09-01

## 2022-09-01 VITALS
RESPIRATION RATE: 18 BRPM | OXYGEN SATURATION: 97 % | SYSTOLIC BLOOD PRESSURE: 143 MMHG | DIASTOLIC BLOOD PRESSURE: 90 MMHG | TEMPERATURE: 98 F | HEART RATE: 72 BPM

## 2022-09-01 LAB
ALBUMIN SERPL ELPH-MCNC: 4.1 G/DL — SIGNIFICANT CHANGE UP (ref 3.3–5)
ALP SERPL-CCNC: 77 U/L — SIGNIFICANT CHANGE UP (ref 40–120)
ALT FLD-CCNC: 71 U/L — HIGH (ref 10–45)
ANION GAP SERPL CALC-SCNC: 15 MMOL/L — SIGNIFICANT CHANGE UP (ref 5–17)
AST SERPL-CCNC: 63 U/L — HIGH (ref 10–40)
BILIRUB SERPL-MCNC: 0.8 MG/DL — SIGNIFICANT CHANGE UP (ref 0.2–1.2)
BUN SERPL-MCNC: 16 MG/DL — SIGNIFICANT CHANGE UP (ref 7–23)
CALCIUM SERPL-MCNC: 9.3 MG/DL — SIGNIFICANT CHANGE UP (ref 8.4–10.5)
CHLORIDE SERPL-SCNC: 102 MMOL/L — SIGNIFICANT CHANGE UP (ref 96–108)
CO2 SERPL-SCNC: 23 MMOL/L — SIGNIFICANT CHANGE UP (ref 22–31)
CREAT SERPL-MCNC: 0.99 MG/DL — SIGNIFICANT CHANGE UP (ref 0.5–1.3)
EGFR: 96 ML/MIN/1.73M2 — SIGNIFICANT CHANGE UP
GLUCOSE SERPL-MCNC: 104 MG/DL — HIGH (ref 70–99)
HCT VFR BLD CALC: 41.4 % — SIGNIFICANT CHANGE UP (ref 39–50)
HGB BLD-MCNC: 14 G/DL — SIGNIFICANT CHANGE UP (ref 13–17)
MCHC RBC-ENTMCNC: 31.3 PG — SIGNIFICANT CHANGE UP (ref 27–34)
MCHC RBC-ENTMCNC: 33.8 GM/DL — SIGNIFICANT CHANGE UP (ref 32–36)
MCV RBC AUTO: 92.6 FL — SIGNIFICANT CHANGE UP (ref 80–100)
NRBC # BLD: 0 /100 WBCS — SIGNIFICANT CHANGE UP (ref 0–0)
PLATELET # BLD AUTO: 191 K/UL — SIGNIFICANT CHANGE UP (ref 150–400)
POTASSIUM SERPL-MCNC: 3.5 MMOL/L — SIGNIFICANT CHANGE UP (ref 3.5–5.3)
POTASSIUM SERPL-SCNC: 3.5 MMOL/L — SIGNIFICANT CHANGE UP (ref 3.5–5.3)
PROT SERPL-MCNC: 6.9 G/DL — SIGNIFICANT CHANGE UP (ref 6–8.3)
RBC # BLD: 4.47 M/UL — SIGNIFICANT CHANGE UP (ref 4.2–5.8)
RBC # FLD: 12.9 % — SIGNIFICANT CHANGE UP (ref 10.3–14.5)
SODIUM SERPL-SCNC: 140 MMOL/L — SIGNIFICANT CHANGE UP (ref 135–145)
T4 FREE SERPL-MCNC: 1.3 NG/DL — SIGNIFICANT CHANGE UP (ref 0.9–1.8)
TSH SERPL-MCNC: 1.23 UIU/ML — SIGNIFICANT CHANGE UP (ref 0.27–4.2)
WBC # BLD: 6.21 K/UL — SIGNIFICANT CHANGE UP (ref 3.8–10.5)
WBC # FLD AUTO: 6.21 K/UL — SIGNIFICANT CHANGE UP (ref 3.8–10.5)

## 2022-09-01 PROCEDURE — 83605 ASSAY OF LACTIC ACID: CPT

## 2022-09-01 PROCEDURE — 84295 ASSAY OF SERUM SODIUM: CPT

## 2022-09-01 PROCEDURE — 85014 HEMATOCRIT: CPT

## 2022-09-01 PROCEDURE — 84484 ASSAY OF TROPONIN QUANT: CPT

## 2022-09-01 PROCEDURE — 82435 ASSAY OF BLOOD CHLORIDE: CPT

## 2022-09-01 PROCEDURE — 80074 ACUTE HEPATITIS PANEL: CPT

## 2022-09-01 PROCEDURE — 84439 ASSAY OF FREE THYROXINE: CPT

## 2022-09-01 PROCEDURE — 82803 BLOOD GASES ANY COMBINATION: CPT

## 2022-09-01 PROCEDURE — 83690 ASSAY OF LIPASE: CPT

## 2022-09-01 PROCEDURE — 99239 HOSP IP/OBS DSCHRG MGMT >30: CPT | Mod: GC

## 2022-09-01 PROCEDURE — 84132 ASSAY OF SERUM POTASSIUM: CPT

## 2022-09-01 PROCEDURE — 84443 ASSAY THYROID STIM HORMONE: CPT

## 2022-09-01 PROCEDURE — 99291 CRITICAL CARE FIRST HOUR: CPT | Mod: 25

## 2022-09-01 PROCEDURE — U0003: CPT

## 2022-09-01 PROCEDURE — 36415 COLL VENOUS BLD VENIPUNCTURE: CPT

## 2022-09-01 PROCEDURE — 75574 CT ANGIO HRT W/3D IMAGE: CPT

## 2022-09-01 PROCEDURE — 80053 COMPREHEN METABOLIC PANEL: CPT

## 2022-09-01 PROCEDURE — 85027 COMPLETE CBC AUTOMATED: CPT

## 2022-09-01 PROCEDURE — 83036 HEMOGLOBIN GLYCOSYLATED A1C: CPT

## 2022-09-01 PROCEDURE — 80061 LIPID PANEL: CPT

## 2022-09-01 PROCEDURE — 85025 COMPLETE CBC W/AUTO DIFF WBC: CPT

## 2022-09-01 PROCEDURE — 86901 BLOOD TYPING SEROLOGIC RH(D): CPT

## 2022-09-01 PROCEDURE — 85018 HEMOGLOBIN: CPT

## 2022-09-01 PROCEDURE — 96374 THER/PROPH/DIAG INJ IV PUSH: CPT

## 2022-09-01 PROCEDURE — 93356 MYOCRD STRAIN IMG SPCKL TRCK: CPT

## 2022-09-01 PROCEDURE — 82550 ASSAY OF CK (CPK): CPT

## 2022-09-01 PROCEDURE — 85610 PROTHROMBIN TIME: CPT

## 2022-09-01 PROCEDURE — 82330 ASSAY OF CALCIUM: CPT

## 2022-09-01 PROCEDURE — 86850 RBC ANTIBODY SCREEN: CPT

## 2022-09-01 PROCEDURE — 85730 THROMBOPLASTIN TIME PARTIAL: CPT

## 2022-09-01 PROCEDURE — 83735 ASSAY OF MAGNESIUM: CPT

## 2022-09-01 PROCEDURE — 84100 ASSAY OF PHOSPHORUS: CPT

## 2022-09-01 PROCEDURE — 76700 US EXAM ABDOM COMPLETE: CPT

## 2022-09-01 PROCEDURE — 86900 BLOOD TYPING SEROLOGIC ABO: CPT

## 2022-09-01 PROCEDURE — 99232 SBSQ HOSP IP/OBS MODERATE 35: CPT | Mod: GC

## 2022-09-01 PROCEDURE — 83880 ASSAY OF NATRIURETIC PEPTIDE: CPT

## 2022-09-01 PROCEDURE — 75574 CT ANGIO HRT W/3D IMAGE: CPT | Mod: 26

## 2022-09-01 PROCEDURE — 71045 X-RAY EXAM CHEST 1 VIEW: CPT

## 2022-09-01 PROCEDURE — 82553 CREATINE MB FRACTION: CPT

## 2022-09-01 PROCEDURE — 82947 ASSAY GLUCOSE BLOOD QUANT: CPT

## 2022-09-01 PROCEDURE — 93306 TTE W/DOPPLER COMPLETE: CPT

## 2022-09-01 RX ORDER — CHLORHEXIDINE GLUCONATE 213 G/1000ML
1 SOLUTION TOPICAL
Refills: 0 | Status: DISCONTINUED | OUTPATIENT
Start: 2022-09-01 | End: 2022-09-01

## 2022-09-01 RX ORDER — CARVEDILOL PHOSPHATE 80 MG/1
1 CAPSULE, EXTENDED RELEASE ORAL
Qty: 0 | Refills: 0 | DISCHARGE

## 2022-09-01 RX ORDER — SODIUM CHLORIDE 9 MG/ML
1000 INJECTION INTRAMUSCULAR; INTRAVENOUS; SUBCUTANEOUS
Refills: 0 | Status: DISCONTINUED | OUTPATIENT
Start: 2022-09-01 | End: 2022-09-01

## 2022-09-01 RX ORDER — CARVEDILOL PHOSPHATE 80 MG/1
1 CAPSULE, EXTENDED RELEASE ORAL
Qty: 60 | Refills: 0
Start: 2022-09-01 | End: 2022-09-30

## 2022-09-01 RX ORDER — CARVEDILOL PHOSPHATE 80 MG/1
25 CAPSULE, EXTENDED RELEASE ORAL EVERY 12 HOURS
Refills: 0 | Status: DISCONTINUED | OUTPATIENT
Start: 2022-09-01 | End: 2022-09-01

## 2022-09-01 RX ORDER — ATORVASTATIN CALCIUM 80 MG/1
1 TABLET, FILM COATED ORAL
Qty: 30 | Refills: 0
Start: 2022-09-01 | End: 2022-09-30

## 2022-09-01 RX ORDER — AMLODIPINE BESYLATE 2.5 MG/1
1 TABLET ORAL
Qty: 0 | Refills: 0 | DISCHARGE

## 2022-09-01 RX ORDER — TICAGRELOR 90 MG/1
1 TABLET ORAL
Qty: 60 | Refills: 0
Start: 2022-09-01 | End: 2022-09-30

## 2022-09-01 RX ORDER — AMLODIPINE BESYLATE 2.5 MG/1
10 TABLET ORAL DAILY
Refills: 0 | Status: DISCONTINUED | OUTPATIENT
Start: 2022-09-01 | End: 2022-09-01

## 2022-09-01 RX ORDER — AMLODIPINE BESYLATE 2.5 MG/1
1 TABLET ORAL
Qty: 30 | Refills: 0
Start: 2022-09-01 | End: 2022-09-30

## 2022-09-01 RX ADMIN — Medication 81 MILLIGRAM(S): at 11:49

## 2022-09-01 RX ADMIN — CHLORHEXIDINE GLUCONATE 1 APPLICATION(S): 213 SOLUTION TOPICAL at 10:47

## 2022-09-01 RX ADMIN — TICAGRELOR 90 MILLIGRAM(S): 90 TABLET ORAL at 17:04

## 2022-09-01 RX ADMIN — ENOXAPARIN SODIUM 40 MILLIGRAM(S): 100 INJECTION SUBCUTANEOUS at 11:49

## 2022-09-01 RX ADMIN — AMLODIPINE BESYLATE 10 MILLIGRAM(S): 2.5 TABLET ORAL at 11:49

## 2022-09-01 RX ADMIN — CARVEDILOL PHOSPHATE 25 MILLIGRAM(S): 80 CAPSULE, EXTENDED RELEASE ORAL at 17:04

## 2022-09-01 RX ADMIN — SODIUM CHLORIDE 100 MILLILITER(S): 9 INJECTION INTRAMUSCULAR; INTRAVENOUS; SUBCUTANEOUS at 14:15

## 2022-09-01 RX ADMIN — TICAGRELOR 90 MILLIGRAM(S): 90 TABLET ORAL at 05:10

## 2022-09-01 NOTE — PHARMACOTHERAPY INTERVENTION NOTE - COMMENTS
Performed medication reconciliation and home medication list updated in outpatient medication review. Medications verified with patient and significant other, Portia, and [facility], which patient stated he picked up medication from. Please refer to specifics in home medication list (outpatient medication review).    Home Medications:  amLODIPine 10 mg oral tablet: 1 tab(s) orally once a day  carvedilol 25 mg oral tablet: 1 tab(s) orally 2 times a day     Patient reports taking 4 medications taken at home, two are listed above. Was able to state the third medication as lisinopril, but unable to verify dose or frequency, and does not remember the name of the last medication. Significant other was also spoken to but cannot remember the medications by name, said she brought medications in to the pharmacy yesterday (amlodipine and carvedilol).    Brooklyn Bryan  PharmD. Candidate 2023  [facility] [employer]

## 2022-09-01 NOTE — PROGRESS NOTE ADULT - PROBLEM SELECTOR PLAN 6
Binge drinking at home 12 beers, last drink 5 days ago   Patient denies hx of withdrawal, seizures  Outside of withdrawal window, will hold off on CIWA  - SBIRT consult
Binge drinking at home 12 beers, last drink 5 days ago   Patient denies hx of withdrawal, seizures  Outside of withdrawal window, will hold off on CIWA  - SBIRT consult

## 2022-09-01 NOTE — DISCHARGE NOTE PROVIDER - CARE PROVIDERS DIRECT ADDRESSES
[email or web address]

## 2022-09-01 NOTE — DISCHARGE NOTE NURSING/CASE MANAGEMENT/SOCIAL WORK - PATIENT PORTAL LINK FT
You can access the FollowMyHealth Patient Portal offered by [facility] by registering at the following website: [email or web address]. By joining [facility]’s FollowMyHealth portal, you will also be able to view your health information using other applications (apps) compatible with our system.

## 2022-09-01 NOTE — DISCHARGE NOTE PROVIDER - HOSPITAL COURSE
Patient is a 48 y/o M with PMH HTN who presents with chest pain. Reports that chest pain started about 2 years ago and is intermittent in nature, worsened with exertion and relieved with rest. Describes the pain as a pressure that is bilateral in the chest but not radiating to the arm or jaw. Typically will last <5 minutes and takes tylenol which relieves the pain typically. He reports nausea and NBNB vomiting over the past 2 days before presenting to the ED. During the episodes of emesis would have increased sweating, coughing and shortness of breath. On day of presentation, he had similar chest pain that started while walking and persisted even at rest. Of note, patient's mother and brother had CVA at 45 and 50 respectively. He denies cardiac history in family members, but some with hypertension.     In the ED:   Received ASA 324mg with resolution of chest pain, as well as brilinta 180mg    S/p heparin 5000 IV push, currently on heparin ggt   After initial EKG with possible HERMINIO, STEMI activated. After evaluation (see separate STEMI note), EKG changes did not meet criteria for STEMI, cath lab not activated emergently.    Hospital Course:  Patient was monitored on telemetry without any significant chest pain or tele events. He was managed with maintenance aspirin and Brilinta. Cardiology Followed, and recommended that although the ST changes noted on EKG may be consistent with LVH, ischemia cannot be ruled out. Trop T was not elevated and peaked. TTE showed LVEF=70%, severe concentric LVH, normal diastolic function, prominent atheroma of aortic arch. Patient received CTA coronaries for ischemic workup which did not show any significant stenosis, however CTA coronaries did show possible esophageal varices.  Patient had an abdominal US which showed hepatic steatosis/possible cirrhosis. Patient advised to follow up outpatient with Gastroenterology to monitor given possible cirrhosis and esophageal varices.     Patient is stable and ready for discharge.

## 2022-09-01 NOTE — DISCHARGE NOTE PROVIDER - NSDCCPTREATMENT_GEN_ALL_CORE_FT
PRINCIPAL PROCEDURE  Procedure: CTA coronary arteries with contrast  Findings and Treatment:   < end of copied text >  IMPRESSION:  Agatston calcium score: 7.  Coronary CTA: Co-coronary dominance. No significant moderate to severe   coronary artery stenosis. Scattered areas of minimal stenosis secondary   to calcified and noncalcified plaque.  Several small paraesophageal nodes are seen adjacent to the distal   esophagus with trace paraesophageal fluid/stranding and small hiatal   hernia. Correlate clinically for gastroesophageal reflux. Tortuous   vessels adjacent to the anterior distal esophagus, images 234-263 series   19. Esophageal varices cannot be excluded. Consider endoscopy for further   evaluation< from: CT Angio Heart and Coronaries w/ IV Cont (09.01.22 @ 15:04) >        SECONDARY PROCEDURE  Procedure: Limited abdominal ultrasound  Findings and Treatment:   < end of copied text >  PROCEDURE DATE:  08/30/2022    INTERPRETATION:  CLINICAL INFORMATION: Abdominal pain and vomiting.  COMPARISON: None available.  TECHNIQUE: Sonography of the abdomen.  FINDINGS:  Liver: Diffuse fatty infiltration, irregular capsular contour suspicious   for cirrhosis.  Bile ducts: Normal caliber. Common bile duct measures 3 mm.  Gallbladder: No cholelithiasis or pericholecystic fluid.  Pancreas: Visualized portions are within normal limits.  Spleen: 10.6 cm. Within normal limits.  Right kidney: 12.2 cm. No hydronephrosis.  Left kidney: 11.5 cm. No hydronephrosis.  Ascites: None.  Aorta and IVC: Visualized portions are within normal limits.  IMPRESSION:  Hepatic steatosis, possible cirrhosis.  No evidence of gallbladder or biliary disease.  --- End of Report ---< from: US Abdomen Complete (US Abdomen Complete .) (08.30.22 @ 09:06) >      Procedure: Transthoracic echo  Findings and Treatment:   < end of copied text >  EF=70%  Conclusions:  1. Aortic Root: 3.1 cm.  LVOT diameter: 2.1 cm.  Prominent atheroma noted in the aortic arch.  2. Severe concentric left ventricular hypertrophy.  3. Normal left ventricular systolic function. No segmental  wall motion abnormalities.  4. Global Longitudinal Strain -18.5% (Alicia, Hr 56 bpm,  /72)  5. Normal right ventricular size and function.  *** No previous Echo exam.< from: Transthoracic Echocardiogram (08.30.22 @ 16:06) >

## 2022-09-01 NOTE — DISCHARGE NOTE NURSING/CASE MANAGEMENT/SOCIAL WORK - NSDCFUADDAPPT_GEN_ALL_CORE_FT
You should schedule a follow up appointment within 1 week of leaving the hospital with Cardiology, Gastroenterology and Primary Care doctor.    You should schedule a gastroenterology follow up appointment to monitor your liver and esophagus.  We provided the information for the [facility] information ([phone])     For uninsured patients, you may also try [facility]: [phone]    APPTS ARE READY TO BE MADE: [ X] YES    Best Family or Patient Contact (if needed):    Additional Information about above appointments (if needed):    1:   2:   3:     Other comments or requests: Amharic Speaking

## 2022-09-01 NOTE — PROGRESS NOTE ADULT - PROBLEM SELECTOR PLAN 1
Patient with typical chest pain at rest, unstable angina, admitted for ACS rule out   -EKG ST-elevation in v1-3 consistent with early repolarization, ST-depressions in I, aVL, v5-6  - Per cards, EKG consistent with LVH and secondary ST changes, though does not exclude ischemia  - Trop in ED 21 --> 17 --> 16   - TTE: EF 70% with severe concentric LVH, no systolic dysfunction or wall motion abnormalities   - Appreciate Cardiology recs  - s/p ASA and Brillinta load  - s/p heparin infusion  - c/w telemetry monitoring In sinus rhythm   - HA1C 6, lipid panel with elevated , cholesterol and triglycerides elevated   - c/w ASA 81mg   - c/w atorvastatin 80mg   - Nuclear Stress test for ischemic work up Patient with typical chest pain at rest, unstable angina, admitted for ACS rule out   -EKG ST-elevation in v1-3 consistent with early repolarization, ST-depressions in I, aVL, v5-6  - Per cards, EKG consistent with LVH and secondary ST changes, though does not exclude ischemia  - Trop in ED 21 --> 17 --> 16   - TTE: EF 70% with severe concentric LVH, no systolic dysfunction or wall motion abnormalities   - Appreciate Cardiology recs  - s/p ASA and Brilinta load  - s/p heparin infusion  - c/w telemetry monitoring In sinus rhythm   - HA1C 6, lipid panel with elevated , cholesterol and triglycerides elevated   - c/w ASA 81mg and Brilinta 90mg BID  - c/w atorvastatin 80mg   - CT Angio coronaries for ischemic work up (Nuclear Stress test would be less ) Patient with typical chest pain at rest, unstable angina, admitted for ACS rule out   -EKG ST-elevation in v1-3 consistent with early repolarization, ST-depressions in I, aVL, v5-6  - Per cards, EKG consistent with LVH and secondary ST changes, though does not exclude ischemia  - Trop in ED 21 --> 17 --> 16   - TTE: EF 70% with severe concentric LVH, no systolic dysfunction or wall motion abnormalities   - Appreciate Cardiology recs  - s/p ASA and Brilinta load  - s/p heparin infusion  - c/w telemetry monitoring In sinus rhythm   - HA1C 6, lipid panel with elevated , cholesterol and triglycerides elevated   - c/w ASA 81mg and Brilinta 90mg BID  - c/w atorvastatin 80mg   - CT Angio coronaries for ischemic work up (Nuclear Stress test would be less accurate in setting of LV hypertrophy)

## 2022-09-01 NOTE — PROGRESS NOTE ADULT - PROBLEM SELECTOR PLAN 5
LA on VBG is 3.4  Patient has not been hypotensive, perfusing well with strong distal pulses   May be 2/2 to alcohol use   - will order stat lactate and check again AM  - lactate 1.7
LA on VBG is 3.4, possibly in the setting of hypoperfusion vs alcohol. Now resolved  Patient has not been hypotensive, perfusing well with strong distal pulses

## 2022-09-01 NOTE — PROGRESS NOTE ADULT - PROBLEM SELECTOR PLAN 4
Cr on BMP 2.19 --> 1.19, unclear baseline. Likely AMY, now resolved  BUN/Cr = 8.7, do not have a baseline Cr to determine a baseline  Most likely pre-renal after improvement today   - monitor Cr following BMP   - avoid nephrotoxic agents   - f/u urine creatinine and sodium Cr on BMP 2.19 --> 1.19, unclear baseline. Likely AMY, now resolved  BUN/Cr = 8.7, do not have a baseline Cr to determine a baseline  Most likely pre-renal after improvement today   - monitor Cr following BMP   - avoid nephrotoxic agents   - maintenance IVF before and after CTA coronaries   - f/u urine creatinine and sodium

## 2022-09-01 NOTE — PROGRESS NOTE ADULT - ASSESSMENT
47M w/ hx of HTN, several years of chronic intermittent typical chest pain, presenting with an acute episode of chest pain concerning for NSTEMI/UA, eccurrently chest pain free and HDS.    Recs:  -Chest pain free at this time. HStrop 21-->17-->16  - s/p aspirin load, brilinta load  -TTE with LVEF=70%, Severe concentric left ventricular hypertrophy. Normal diastolic function. Prominent atheroma in the aortic arch.   - can discontinue  hep gtt  - continue aspirin 81mg daily  - c/w atorvastatin 80mg daily  -please restart the patients home antihypertensives, including carvedilol.  -Would obtain Nuclear stress test  - check A1c, lipids, TSH/ FT4  - monitor on telemetry  -If the patient has recurrent chest pain, please obtain repeat EKG, cardiac enzymes and call the cardiology fellow      Rojas Lama, Cardiology Fellow, F-1    For all New Consults and Questions:  [email or web address]   Login: [email or web address]    *** Recommendations are preliminary until cosigned by the attending. 47M w/ hx of HTN, several years of chronic intermittent typical chest pain, presenting with an acute episode of chest pain concerning for NSTEMI/UA, eccurrently chest pain free and HDS.    Recs:  -Chest pain free at this time. HStrop 21-->17-->16  - s/p aspirin load, brilinta loaded,a nd s/p hep gtt  -TTE with LVEF=70%, Severe concentric left ventricular hypertrophy. Normal diastolic function. Prominent atheroma in the aortic arch.   - continue aspirin 81mg daily  - c/w atorvastatin 80mg daily  -please restart the patients home antihypertensives, including carvedilol.  -Would obtain Nuclear stress test  - check A1c, lipids, TSH/ FT4  - monitor on telemetry  -If the patient has recurrent chest pain, please obtain repeat EKG, cardiac enzymes and call the cardiology fellow      Rojas Lama, Cardiology Fellow, F-1    For all New Consults and Questions:  [email or web address]   Login: [email or web address]    *** Recommendations are preliminary until cosigned by the attending.

## 2022-09-01 NOTE — PROGRESS NOTE ADULT - SUBJECTIVE AND OBJECTIVE BOX
Patient seen and examined at bedside.    Overnight Events: No acute events overnight. Denies chest pain or SOB      REVIEW OF SYSTEMS:  Constitutional:     [ x] negative [ ] fevers [ ] chills [ ] weight loss [ ] weight gain  HEENT:                  [ x] negative [ ] dry eyes [ ] eye irritation [ ] postnasal drip [ ] nasal congestion  CV:                         [x ] negative  [ ] chest pain [ ] orthopnea [ ] palpitations [ ] murmur  Resp:                     [ x] negative [ ] cough [ ] shortness of breath [ ] dyspnea [ ] wheezing [ ] sputum [ ]hemoptysis  GI:                          [ x] negative [ ] nausea [ ] vomiting [ ] diarrhea [ ] constipation [ ] abd pain [ ] dysphagia   :                        [ x] negative [ ] dysuria [ ] nocturia [ ] hematuria [ ] increased urinary frequency  Musculoskeletal: [ x] negative [ ] back pain [ ] myalgias [ ] arthralgias [ ] fracture  Skin:                       [ x] negative [ ] rash [ ] itch  Neurological:        [ x] negative [ ] headache [ ] dizziness [ ] syncope [ ] weakness [ ] numbness  Psychiatric:           [ x] negative [ ] anxiety [ ] depression  Endocrine:            [ x] negative [ ] diabetes [ ] thyroid problem  Heme/Lymph:      [ x] negative [ ] anemia [ ] bleeding problem  Allergic/Immune: [x ] negative [ ] itchy eyes [ ] nasal discharge [ ] hives [ ] angioedema    [x ] All other systems negative  [ ] Unable to assess ROS due to    Current Meds:  aspirin  chewable 81 milliGRAM(s) Oral daily  atorvastatin 80 milliGRAM(s) Oral at bedtime  enoxaparin Injectable 40 milliGRAM(s) SubCutaneous every 24 hours  ticagrelor 90 milliGRAM(s) Oral every 12 hours      PAST MEDICAL & SURGICAL HISTORY:  Hypertension      Alcohol dependence          Vitals:  T(F): 98 (09-01), Max: 98.6 (08-31)  HR: 62 (09-01) (62 - 67)  BP: 149/92 (09-01) (138/91 - 164/84)  RR: 18 (09-01)  SpO2: 96% (09-01)  I&O's Summary    31 Aug 2022 07:01  -  01 Sep 2022 06:43  --------------------------------------------------------  IN: 240 mL / OUT: 0 mL / NET: 240 mL        Physical Exam:  - gen: well appearing, laying in hospital bed, NAD  - HEENT: MMM, NCAT  - neck: no JVD, supple  - heart: RRR, nml S1/S2, no m/r/g  - lungs: CTABL, nml WOB, no w/c/r  - abd: soft, NTND, no r/g  - ext: WWP, no LE edema b/l                            14.0   6.21  )-----------( 191      ( 01 Sep 2022 05:51 )             41.4     08-31    137  |  96  |  20  ----------------------------<  112<H>  3.3<L>   |  26  |  1.19    Ca    9.4      31 Aug 2022 05:05  Phos  4.0     08-31  Mg     2.4     08-31    TPro  7.4  /  Alb  4.4  /  TBili  0.8  /  DBili  x   /  AST  99<H>  /  ALT  77<H>  /  AlkPhos  85  08-31    PT/INR - ( 30 Aug 2022 07:05 )   PT: 12.1 sec;   INR: 1.04 ratio         PTT - ( 31 Aug 2022 05:07 )  PTT:73.0 sec  CARDIAC MARKERS ( 30 Aug 2022 06:52 )  x     / x     / 144 U/L / x     / 1.6 ng/mL      Serum Pro-Brain Natriuretic Peptide: 30 pg/mL (08-31 @ 05:05)  Serum Pro-Brain Natriuretic Peptide: 110 pg/mL (08-30 @ 11:37)

## 2022-09-01 NOTE — DISCHARGE NOTE NURSING/CASE MANAGEMENT/SOCIAL WORK - NSDCPEFALRISK_GEN_ALL_CORE
For information on Fall & Injury Prevention, visit: [email or web address] OR  [email or web address] OR  https://www.cdc.gov/steadi/patient.html

## 2022-09-01 NOTE — DISCHARGE NOTE PROVIDER - CARE PROVIDER_API CALL
Dre Demarco)  Cardiology; Internal Medicine  [address] [place] [address], [id number]  [place] [address]  Phone: [phone]  Fax: [phone]  Follow Up Time: 1 week

## 2022-09-01 NOTE — DISCHARGE NOTE PROVIDER - NSFOLLOWUPCLINICS_GEN_ALL_ED_FT
Gastroenterology at [facility]  Gastroenterology  [address] [place], [place] [address]  Phone: [phone]  Fax:   Follow Up Time: 1 week

## 2022-09-01 NOTE — PROGRESS NOTE ADULT - PROBLEM SELECTOR PLAN 7
VTE ppx: started lovenox with recovered renal fxn     Diet: low sodium    Full Code
VTE ppx: started lovenox with recovered renal fxn     Diet: low sodium    Full Code

## 2022-09-01 NOTE — PROGRESS NOTE ADULT - SUBJECTIVE AND OBJECTIVE BOX
PROGRESS NOTE:   Written by Abhay Davis PGY-3    Patient is a 47y old  Male who presents with a chief complaint of N/V, chest pain (01 Sep 2022 06:43)      SUBJECTIVE / OVERNIGHT EVENTS: No acute events overnight.     ADDITIONAL REVIEW OF SYSTEMS:    MEDICATIONS  (STANDING):  aspirin  chewable 81 milliGRAM(s) Oral daily  atorvastatin 80 milliGRAM(s) Oral at bedtime  enoxaparin Injectable 40 milliGRAM(s) SubCutaneous every 24 hours  ticagrelor 90 milliGRAM(s) Oral every 12 hours    MEDICATIONS  (PRN):      CAPILLARY BLOOD GLUCOSE        I&O's Summary    31 Aug 2022 07:01  -  01 Sep 2022 07:00  --------------------------------------------------------  IN: 240 mL / OUT: 0 mL / NET: 240 mL        PHYSICAL EXAM:  Vital Signs Last 24 Hrs  T(C): 36.7 (01 Sep 2022 04:12), Max: 37 (31 Aug 2022 16:43)  T(F): 98 (01 Sep 2022 04:12), Max: 98.6 (31 Aug 2022 16:43)  HR: 62 (01 Sep 2022 04:12) (62 - 67)  BP: 149/92 (01 Sep 2022 04:12) (138/91 - 164/84)  BP(mean): --  RR: 18 (01 Sep 2022 04:12) (16 - 18)  SpO2: 96% (01 Sep 2022 04:12) (96% - 99%)    Parameters below as of 01 Sep 2022 04:12  Patient On (Oxygen Delivery Method): room air        GENERAL: No acute distress, well-developed  HEAD:  Atraumatic, Normocephalic  EYES: EOMI, PERRLA, conjunctiva and sclera clear  NECK: Supple, no lymphadenopathy, no JVD  CHEST/LUNG: CTAB; No wheezes, rales, or rhonchi  HEART: Regular rate and rhythm; No murmurs, rubs, or gallops  ABDOMEN: Soft, non-tender, non-distended; normal bowel sounds  EXTREMITIES:  2+ peripheral pulses b/l, No LE edema  NEUROLOGY: A&O x 3, no focal deficits  SKIN: No rashes or lesions    LABS:                        14.0   6.21  )-----------( 191      ( 01 Sep 2022 05:51 )             41.4     09-01    140  |  102  |  16  ----------------------------<  104<H>  3.5   |  23  |  0.99    Ca    9.3      01 Sep 2022 05:51  Phos  4.0     08-31  Mg     2.4     08-31    TPro  6.9  /  Alb  4.1  /  TBili  0.8  /  DBili  x   /  AST  63<H>  /  ALT  71<H>  /  AlkPhos  77  09-01    PTT - ( 31 Aug 2022 05:07 )  PTT:73.0 sec            RADIOLOGY & ADDITIONAL TESTS:  Results Reviewed:   Imaging Personally Reviewed:  Electrocardiogram Personally Reviewed:    COORDINATION OF CARE:  Care Discussed with Consultants/Other Providers [Y/N]:  Prior or Outpatient Records Reviewed [Y/N]:   PROGRESS NOTE:   Written by Abhay Davis PGY-3    Patient is a 47y old  Male who presents with a chief complaint of N/V, chest pain (01 Sep 2022 06:43)     services during encounter: Lida [id number]     SUBJECTIVE / OVERNIGHT EVENTS: No acute events overnight. Patient reports he feels well overall, has had mild intermittent chest discomfort, but no chest pain.     ADDITIONAL REVIEW OF SYSTEMS: Patient denies active chest pain, shortness of breath, abdominal pain, fevers, chills, constipation, diarrhea, leg swelling or pain    MEDICATIONS  (STANDING):  aspirin  chewable 81 milliGRAM(s) Oral daily  atorvastatin 80 milliGRAM(s) Oral at bedtime  enoxaparin Injectable 40 milliGRAM(s) SubCutaneous every 24 hours  ticagrelor 90 milliGRAM(s) Oral every 12 hours    MEDICATIONS  (PRN):      CAPILLARY BLOOD GLUCOSE        I&O's Summary    31 Aug 2022 07:01  -  01 Sep 2022 07:00  --------------------------------------------------------  IN: 240 mL / OUT: 0 mL / NET: 240 mL        PHYSICAL EXAM:  Vital Signs Last 24 Hrs  T(C): 36.7 (01 Sep 2022 04:12), Max: 37 (31 Aug 2022 16:43)  T(F): 98 (01 Sep 2022 04:12), Max: 98.6 (31 Aug 2022 16:43)  HR: 62 (01 Sep 2022 04:12) (62 - 67)  BP: 149/92 (01 Sep 2022 04:12) (138/91 - 164/84)  BP(mean): --  RR: 18 (01 Sep 2022 04:12) (16 - 18)  SpO2: 96% (01 Sep 2022 04:12) (96% - 99%)    Parameters below as of 01 Sep 2022 04:12  Patient On (Oxygen Delivery Method): room air        GENERAL: No acute distress, well-developed  HEAD:  Atraumatic, Normocephalic  EYES: EOMI, PERRLA, conjunctiva and sclera clear  NECK: Supple, no lymphadenopathy, no JVD  CHEST/LUNG: CTAB; No wheezes, rales, or rhonchi  HEART: Regular rate and rhythm; No murmurs, rubs, or gallops  ABDOMEN: Soft, non-tender, non-distended; normal bowel sounds  EXTREMITIES:  2+ peripheral pulses b/l, No LE edema  NEUROLOGY: A&O x 3, no focal deficits  SKIN: No rashes or lesions    LABS:                        14.0   6.21  )-----------( 191      ( 01 Sep 2022 05:51 )             41.4     09-01    140  |  102  |  16  ----------------------------<  104<H>  3.5   |  23  |  0.99    Ca    9.3      01 Sep 2022 05:51  Phos  4.0     08-31  Mg     2.4     08-31    TPro  6.9  /  Alb  4.1  /  TBili  0.8  /  DBili  x   /  AST  63<H>  /  ALT  71<H>  /  AlkPhos  77  09-01    PTT - ( 31 Aug 2022 05:07 )  PTT:73.0 sec            RADIOLOGY & ADDITIONAL TESTS:  Results Reviewed:   Imaging Personally Reviewed:  Electrocardiogram Personally Reviewed:    COORDINATION OF CARE:  Care Discussed with Consultants/Other Providers [Y/N]:  Prior or Outpatient Records Reviewed [Y/N]:

## 2022-09-01 NOTE — DISCHARGE NOTE PROVIDER - NSDCFUADDAPPT_GEN_ALL_CORE_FT
You should schedule a follow up appointment within 1 week of leaving the hospital with Cardiology, Gastroenterology and Primary Care doctor.    We provided the information for the [facility]. If there is any issue with insurance, this is another Gastroenterology Clinic  [facility]: [phone]    APPTS ARE READY TO BE MADE: [ ] YES    Best Family or Patient Contact (if needed):    Additional Information about above appointments (if needed):    1:   2:   3:     Other comments or requests: Thai Speaking   You should schedule a follow up appointment within 1 week of leaving the hospital with Cardiology, Gastroenterology and Primary Care doctor.    You should schedule a gastroenterology follow up appointment to monitor your liver and esophagus.  We provided the information for the [facility] information ([phone])     For uninsured patients, you may also try [facility]: [phone]    APPTS ARE READY TO BE MADE: [ X] YES    Best Family or Patient Contact (if needed):    Additional Information about above appointments (if needed):    1:   2:   3:     Other comments or requests: Japanese Speaking   You should schedule a follow up appointment within 1 week of leaving the hospital with Cardiology, Gastroenterology and Primary Care doctor.    You should schedule a gastroenterology follow up appointment to monitor your liver and esophagus.  We provided the information for the [facility] information ([phone])     For uninsured patients, you may also try [facility]: [phone]    APPTS ARE READY TO BE MADE: [ X] YES    Best Family or Patient Contact (if needed):    Additional Information about above appointments (if needed):    1:   2:   3:     Other comments or requests: Telugu Speaking    Patient was provided with follow up request details and was advised to call to schedule follow up within specified time frame.  You should schedule a follow up appointment within 1 week of leaving the hospital with Cardiology, Gastroenterology and Primary Care doctor.    You should schedule a gastroenterology follow up appointment to monitor your liver and esophagus.  We provided the information for the [facility] information ([phone])     For uninsured patients, you may also try [facility]astroenterology Clinic: [phone]    APPTS ARE READY TO BE MADE: [ X] YES    Best Family or Patient Contact (if needed):    Additional Information about above appointments (if needed):    1:   2:   3:     Other comments or requests: Lithuanian Speaking    Patient was provided with follow up request details and was warm transferred to the office to schedule the appointment on their own.   Patient was provided with follow up request details and was advised to call to schedule follow up within specified time frame.

## 2022-09-01 NOTE — PROGRESS NOTE ADULT - PROBLEM SELECTOR PLAN 2
BP 140s systolic  Spouse brought in meds, takes amlodipine 10mg and coreg 25 BID   - resuming Carvedilol and Amlodipine Spouse brought in meds, takes amlodipine 10mg and coreg 25 BID   - resuming Carvedilol and Amlodipine

## 2022-09-01 NOTE — PROGRESS NOTE ADULT - PROBLEM SELECTOR PLAN 3
AST 55, ALT 60  U/S abd 8/30: Hepatic steatosis, possible cirrhosis, no evidence of gallbladder or biliary disease  May be secondary to alcoholic liver disease   Patient w/o thrombocytopenia, coagulopathy, jaundice, volume overload  8/31 - AST 99, ALT 77   - hepatitis panel pending   - following CMP
Transaminitis now downtrending (9/1), May be secondary to alcoholic liver disease. Patient w/o thrombocytopenia, coagulopathy, jaundice, volume overload  - U/S abd 8/30: Hepatic steatosis, possible cirrhosis, no evidence of gallbladder or biliary disease  - hepatitis panel neg  - trend CMP

## 2022-09-01 NOTE — PROGRESS NOTE ADULT - ATTENDING COMMENTS
The patient was seen and examined with the Cardiology Consultation Teaching Service.   In-person  used    No overnight events    No chest pain  No dyspnea, orthopnea or PND  No palpitations or dizziness     Comfortable-appearing man in no acute distress  Alert and oriented  Afebrile  Vital signs stable  JVP is not elevated  Clear lungs  Normal heart sounds  Soft, non-tender, non-distended abdomen  Extremities are warm and perfused  No peripheral edema     Normal blood counts  PTT 73 on unfractionated heparin  GFR 77    hs-troponin 16, 17, 21  pro-BNP 30    , , HDL 47, ; NHDL 192  HbA1c 6.0    Impression and Recommendations:  47-year-old man with hypertension who presented after experiencing persistent chest pain on the day of admission.    The patient's ongoing chest pain for the last several years is suggestive of a stable angina syndrome, which increases my suspicion for an acute event. The ECG is consistent with LVH and secondary ST changes, although this does not exclude the presence of ischemia. Initial cardiac biomarkers are unremarkable.     Given normal cardiac biomarkers and echocardiography will plan for further diagnostic testing with stress testing.  Safe to discontinue unfractionate heparin.  Continue aspirin and ticagrelor, high-dose high-potency statin.   If stress testing is normal, would adjust medical therapy based on patient's ASCVD Risk.    Dre Demarco MD  Cardiology  [phone]
The patient was seen and examined with the Cardiology Consultation Teaching Service.     No overnight events    No chest pain  No dyspnea, orthopnea or PND  No palpitations or dizziness     Comfortable-appearing man in no acute distress  Alert and oriented  Afebrile  Vital signs stable  JVP is not elevated  Clear lungs  Normal heart sounds  Extremities are warm and perfused  No peripheral edema     Normal blood counts  GFR 96    hs-troponin 16, 17, 21  pro-BNP 30    , , HDL 47, ; NHDL 192  HbA1c 6.0    Impression and Recommendations:  47-year-old man with hypertension who presented after experiencing persistent chest pain on the day of admission.    The patient's ongoing chest pain for the last several years is suggestive of a stable angina syndrome, which increases my suspicion for an acute event. The ECG is consistent with LVH and secondary ST changes, although this does not exclude the presence of ischemia. Initial cardiac biomarkers are unremarkable.     Given normal cardiac biomarkers and echocardiography will plan for further noninvasive diagnostic testing,  Called by stress lab regarding concerns of image quality with LVH, and suggested changing to coronary CTA.    Continue aspirin and ticagrelor, high-dose high-potency statin.   Lipid profile is suggestive of an atherogenic phenotype, so would likely maintain the patient on a high-potency statin regardless of test results.     Dre Demarco MD  Cardiology  [phone]
47M w/pmh HTN p/w chest pain. No STEMI on EKG, however will investigate for CAD. NSTEMI ruled out.. Cont aspirin, brilinta. Cardiology recs appreciated. Also found to have AMY, pre-renal etiology, but now resolved. Plan for CT coronary.    Incidentally found to have hepatic steatosis, possibly cirrhosis. This is likely due to alcohol use - advised patient he needs to stop drinking to prevent further worsening of liver disease.    If CT coronaries normal will discharge home today. 35 minutes spent coordinating discharge.
47M w/pmh HTN p/w chest pain. No STEMI on EKG, however will investigate for CAD. NSTEMI ruled out since TTE had no WMA. Cont aspirin, brilinta. Cardiology recs appreciated. Also found to have AMY, pre-renal etiology, but now resolved. Plan for nuclear stress test.    Incidentally found to have hepatic steatosis, possibly cirrhosis. This is likely due to alcohol use - advised patient he needs to stop drinking to prevent further worsening of liver disease.

## 2022-09-01 NOTE — DISCHARGE NOTE PROVIDER - NSDCMRMEDTOKEN_GEN_ALL_CORE_FT
amLODIPine 10 mg oral tablet: 1 tab(s) orally once a day  carvedilol 25 mg oral tablet: 1 tab(s) orally 2 times a day   amLODIPine 10 mg oral tablet: 1 tab(s) orally once a day  atorvastatin 40 mg oral tablet: 1 tab(s) orally once a day   carvedilol 25 mg oral tablet: 1 tab(s) orally 2 times a day

## 2023-01-24 NOTE — DISCHARGE NOTE PROVIDER - NSDCCPCAREPLAN_GEN_ALL_CORE_FT
PRINCIPAL DISCHARGE DIAGNOSIS  Diagnosis: Chest pain  Assessment and Plan of Treatment: You came to the hospital with chest pain. You had an EKG with nonspecific changes. You had an echo of your heart which showed thickening of your heart muscle, but normal pumping function. Blood tests of your heart enzyme were not elevated and downtrended in the ED. You were treated with medications for your heart. The Cardiology Followed, and you had a CT scan to check the vessels around your heart which was not concerning for any significant narrowing of the vessels.  CTA coronaries did show possible esophageal varices.  The Cardiology team believe that your chest pain was not due to a heart attack or from decreased blood flow to your heart, and recommend that you do not need to take antiplatelet medications at this time  You should schedule a follow up appointment with Cardiology within 1 week after leaving the hospital.  If you experience chest pain, heart racing or fluttering, shortness of breath, dizziness or severe headaches, you should seek medical attention.      SECONDARY DISCHARGE DIAGNOSES  Diagnosis: Hepatic steatosis  Assessment and Plan of Treatment: You had an ultrasound of your abdomen which showed changes in your liver concerning for hepatic steatosis and possible cirrhosis of the liver. This may be a result of heavy alcohol use. You also had the CT scan of your heart which showed changes in your esophagus that may be varices in your esophagus. You have not had any blood in your vomit or stool, but we recommend you follow up with Gastroenterolgy after leaving the hospital.   We will provide contact information for Gastroenterology Clinics for you to call to schedule an appointment to monitor your liver and esophagus.   [facility]: [phone]  If you notice and blood in your stools, bloody vomit, or feel dizzy or faint, you should seek medical attention.    Diagnosis: Hypertension  Assessment and Plan of Treatment: You should continue taking your blood pressure medicatoins Carvedilol and Amlodipine as prescribed    Diagnosis: Hyperlipidemia  Assessment and Plan of Treatment: You were found to have elevated cholesterol in the hospital, and you were started on a medicatoin called Atorvastatin. You should continue to take atorvastatin every day as prescribed.     Home PRINCIPAL DISCHARGE DIAGNOSIS  Diagnosis: Chest pain  Assessment and Plan of Treatment: You came to the hospital with chest pain. You had an EKG with nonspecific changes. You had an echo of your heart which showed thickening of your heart muscle, but normal pumping function. Blood tests of your heart enzyme were not elevated and downtrended in the ED. You were treated with medications for your heart. The Cardiology team recommended a CT scan to check the vessels around your heart which was not concerning for any significant narrowing of the vessels.  CTA coronaries did show possible esophageal varices.  The Cardiology team believe that your chest pain was not due to a heart attack or from decreased blood flow to your heart, and recommend that you do not need to take antiplatelet medications at this time  You should schedule a follow up appointment with Cardiology within 1 week after leaving the hospital.  If you experience chest pain, heart racing or fluttering, shortness of breath, dizziness or severe headaches, you should seek medical attention.      SECONDARY DISCHARGE DIAGNOSES  Diagnosis: Hepatic steatosis  Assessment and Plan of Treatment: You had an ultrasound of your abdomen which showed changes in your liver concerning for hepatic steatosis and possible cirrhosis of the liver. This may be a result of heavy alcohol use. You also had the CT scan of your heart which showed changes in your esophagus that may be varices in your esophagus. You have not had any blood in your vomit or stool, but we recommend you follow up with Gastroenterolgy after leaving the hospital.   We will provide contact information for Gastroenterology Clinics for you to call to schedule an appointment to monitor your liver and esophagus.   [facility]: [phone]  If you notice and blood in your stools, bloody vomit, or feel dizzy or faint, you should seek medical attention.    Diagnosis: Hypertension  Assessment and Plan of Treatment: You should continue taking your blood pressure medicatoins Carvedilol and Amlodipine as prescribed    Diagnosis: Hyperlipidemia  Assessment and Plan of Treatment: You were found to have elevated cholesterol in the hospital, and you were started on a medicatoin called Atorvastatin. You should continue to take atorvastatin every day as prescribed. The medication was sent to [facility] at [facility].

## 2024-01-24 ENCOUNTER — INPATIENT (INPATIENT)
Facility: HOSPITAL | Age: 49
LOS: 2 days | Discharge: HOME | DRG: 305 | End: 2024-01-27
Attending: INTERNAL MEDICINE | Admitting: INTERNAL MEDICINE
Payer: MEDICAID

## 2024-01-24 VITALS
SYSTOLIC BLOOD PRESSURE: 247 MMHG | RESPIRATION RATE: 20 BRPM | TEMPERATURE: 99 F | WEIGHT: 164.91 LBS | HEART RATE: 111 BPM | OXYGEN SATURATION: 96 % | DIASTOLIC BLOOD PRESSURE: 150 MMHG

## 2024-01-24 DIAGNOSIS — I16.1 HYPERTENSIVE EMERGENCY: ICD-10-CM

## 2024-01-24 PROBLEM — F10.20 ALCOHOL DEPENDENCE, UNCOMPLICATED: Chronic | Status: ACTIVE | Noted: 2022-08-30

## 2024-01-24 PROBLEM — I10 ESSENTIAL (PRIMARY) HYPERTENSION: Chronic | Status: ACTIVE | Noted: 2022-08-30

## 2024-01-24 LAB
ALBUMIN SERPL ELPH-MCNC: 4.3 G/DL — SIGNIFICANT CHANGE UP (ref 3.3–5)
ALP SERPL-CCNC: 168 U/L — HIGH (ref 40–120)
ALT FLD-CCNC: 162 U/L — HIGH (ref 10–45)
ANION GAP SERPL CALC-SCNC: 15 MMOL/L — SIGNIFICANT CHANGE UP (ref 5–17)
APPEARANCE UR: CLEAR — SIGNIFICANT CHANGE UP
AST SERPL-CCNC: 159 U/L — HIGH (ref 10–40)
B-OH-BUTYR SERPL-SCNC: 2.1 MMOL/L — HIGH
BACTERIA # UR AUTO: NEGATIVE /HPF — SIGNIFICANT CHANGE UP
BASE EXCESS BLDV CALC-SCNC: 3.2 MMOL/L — HIGH (ref -2–3)
BASOPHILS # BLD AUTO: 0.06 K/UL — SIGNIFICANT CHANGE UP (ref 0–0.2)
BASOPHILS NFR BLD AUTO: 1 % — SIGNIFICANT CHANGE UP (ref 0–2)
BILIRUB SERPL-MCNC: 0.8 MG/DL — SIGNIFICANT CHANGE UP (ref 0.2–1.2)
BILIRUB UR-MCNC: NEGATIVE — SIGNIFICANT CHANGE UP
BUN SERPL-MCNC: 12 MG/DL — SIGNIFICANT CHANGE UP (ref 7–23)
CA-I SERPL-SCNC: 1.24 MMOL/L — SIGNIFICANT CHANGE UP (ref 1.15–1.33)
CALCIUM SERPL-MCNC: 9.8 MG/DL — SIGNIFICANT CHANGE UP (ref 8.4–10.5)
CAST: 1 /LPF — SIGNIFICANT CHANGE UP (ref 0–4)
CHLORIDE BLDV-SCNC: 94 MMOL/L — LOW (ref 96–108)
CHLORIDE SERPL-SCNC: 92 MMOL/L — LOW (ref 96–108)
CO2 BLDV-SCNC: 31 MMOL/L — HIGH (ref 22–26)
CO2 SERPL-SCNC: 25 MMOL/L — SIGNIFICANT CHANGE UP (ref 22–31)
COLOR SPEC: YELLOW — SIGNIFICANT CHANGE UP
CREAT SERPL-MCNC: 0.61 MG/DL — SIGNIFICANT CHANGE UP (ref 0.5–1.3)
DIFF PNL FLD: ABNORMAL
EGFR: 120 ML/MIN/1.73M2 — SIGNIFICANT CHANGE UP
EOSINOPHIL # BLD AUTO: 0.09 K/UL — SIGNIFICANT CHANGE UP (ref 0–0.5)
EOSINOPHIL NFR BLD AUTO: 1.5 % — SIGNIFICANT CHANGE UP (ref 0–6)
GAS PNL BLDV: 132 MMOL/L — LOW (ref 136–145)
GAS PNL BLDV: SIGNIFICANT CHANGE UP
GLUCOSE BLDC GLUCOMTR-MCNC: 329 MG/DL — HIGH (ref 70–99)
GLUCOSE BLDC GLUCOMTR-MCNC: 329 MG/DL — HIGH (ref 70–99)
GLUCOSE BLDV-MCNC: 450 MG/DL — CRITICAL HIGH (ref 70–99)
GLUCOSE SERPL-MCNC: 528 MG/DL — CRITICAL HIGH (ref 70–99)
GLUCOSE UR QL: 500 MG/DL
HCO3 BLDV-SCNC: 29 MMOL/L — SIGNIFICANT CHANGE UP (ref 22–29)
HCT VFR BLD CALC: 42.7 % — SIGNIFICANT CHANGE UP (ref 39–50)
HCT VFR BLDA CALC: 44 % — SIGNIFICANT CHANGE UP (ref 39–51)
HGB BLD CALC-MCNC: 14.7 G/DL — SIGNIFICANT CHANGE UP (ref 12.6–17.4)
HGB BLD-MCNC: 14.2 G/DL — SIGNIFICANT CHANGE UP (ref 13–17)
HIV 1 & 2 AB SERPL IA.RAPID: SIGNIFICANT CHANGE UP
IMM GRANULOCYTES NFR BLD AUTO: 0.2 % — SIGNIFICANT CHANGE UP (ref 0–0.9)
KETONES UR-MCNC: 15 MG/DL
LACTATE BLDV-MCNC: 1.9 MMOL/L — SIGNIFICANT CHANGE UP (ref 0.5–2)
LEUKOCYTE ESTERASE UR-ACNC: NEGATIVE — SIGNIFICANT CHANGE UP
LYMPHOCYTES # BLD AUTO: 1.25 K/UL — SIGNIFICANT CHANGE UP (ref 1–3.3)
LYMPHOCYTES # BLD AUTO: 21.4 % — SIGNIFICANT CHANGE UP (ref 13–44)
MCHC RBC-ENTMCNC: 31.2 PG — SIGNIFICANT CHANGE UP (ref 27–34)
MCHC RBC-ENTMCNC: 33.3 GM/DL — SIGNIFICANT CHANGE UP (ref 32–36)
MCV RBC AUTO: 93.8 FL — SIGNIFICANT CHANGE UP (ref 80–100)
MONOCYTES # BLD AUTO: 0.46 K/UL — SIGNIFICANT CHANGE UP (ref 0–0.9)
MONOCYTES NFR BLD AUTO: 7.9 % — SIGNIFICANT CHANGE UP (ref 2–14)
NEUTROPHILS # BLD AUTO: 3.98 K/UL — SIGNIFICANT CHANGE UP (ref 1.8–7.4)
NEUTROPHILS NFR BLD AUTO: 68 % — SIGNIFICANT CHANGE UP (ref 43–77)
NITRITE UR-MCNC: NEGATIVE — SIGNIFICANT CHANGE UP
NRBC # BLD: 0 /100 WBCS — SIGNIFICANT CHANGE UP (ref 0–0)
PCO2 BLDV: 48 MMHG — SIGNIFICANT CHANGE UP (ref 42–55)
PH BLDV: 7.39 — SIGNIFICANT CHANGE UP (ref 7.32–7.43)
PH UR: 6 — SIGNIFICANT CHANGE UP (ref 5–8)
PLATELET # BLD AUTO: 233 K/UL — SIGNIFICANT CHANGE UP (ref 150–400)
PO2 BLDV: 46 MMHG — HIGH (ref 25–45)
POTASSIUM BLDV-SCNC: 3.9 MMOL/L — SIGNIFICANT CHANGE UP (ref 3.5–5.1)
POTASSIUM SERPL-MCNC: 4.1 MMOL/L — SIGNIFICANT CHANGE UP (ref 3.5–5.3)
POTASSIUM SERPL-SCNC: 4.1 MMOL/L — SIGNIFICANT CHANGE UP (ref 3.5–5.3)
PROT SERPL-MCNC: 7.9 G/DL — SIGNIFICANT CHANGE UP (ref 6–8.3)
PROT UR-MCNC: 30 MG/DL
RBC # BLD: 4.55 M/UL — SIGNIFICANT CHANGE UP (ref 4.2–5.8)
RBC # FLD: 11.8 % — SIGNIFICANT CHANGE UP (ref 10.3–14.5)
RBC CASTS # UR COMP ASSIST: 5 /HPF — HIGH (ref 0–4)
REVIEW: SIGNIFICANT CHANGE UP
SAO2 % BLDV: 75.1 % — SIGNIFICANT CHANGE UP (ref 67–88)
SODIUM SERPL-SCNC: 132 MMOL/L — LOW (ref 135–145)
SP GR SPEC: >1.03 — HIGH (ref 1–1.03)
SQUAMOUS # UR AUTO: 3 /HPF — SIGNIFICANT CHANGE UP (ref 0–5)
TROPONIN T, HIGH SENSITIVITY RESULT: 15 NG/L — SIGNIFICANT CHANGE UP (ref 0–51)
UROBILINOGEN FLD QL: 0.2 MG/DL — SIGNIFICANT CHANGE UP (ref 0.2–1)
WBC # BLD: 5.85 K/UL — SIGNIFICANT CHANGE UP (ref 3.8–10.5)
WBC # FLD AUTO: 5.85 K/UL — SIGNIFICANT CHANGE UP (ref 3.8–10.5)
WBC UR QL: 4 /HPF — SIGNIFICANT CHANGE UP (ref 0–5)

## 2024-01-24 PROCEDURE — 71045 X-RAY EXAM CHEST 1 VIEW: CPT | Mod: 26

## 2024-01-24 PROCEDURE — 99291 CRITICAL CARE FIRST HOUR: CPT

## 2024-01-24 PROCEDURE — 93010 ELECTROCARDIOGRAM REPORT: CPT

## 2024-01-24 PROCEDURE — 99292 CRITICAL CARE ADDL 30 MIN: CPT

## 2024-01-24 RX ORDER — AMLODIPINE BESYLATE 2.5 MG/1
10 TABLET ORAL ONCE
Refills: 0 | Status: COMPLETED | OUTPATIENT
Start: 2024-01-24 | End: 2024-01-24

## 2024-01-24 RX ORDER — GLUCAGON INJECTION, SOLUTION 0.5 MG/.1ML
1 INJECTION, SOLUTION SUBCUTANEOUS ONCE
Refills: 0 | Status: DISCONTINUED | OUTPATIENT
Start: 2024-01-24 | End: 2024-01-27

## 2024-01-24 RX ORDER — INSULIN LISPRO 100/ML
6 VIAL (ML) SUBCUTANEOUS
Refills: 0 | Status: DISCONTINUED | OUTPATIENT
Start: 2024-01-24 | End: 2024-01-25

## 2024-01-24 RX ORDER — INSULIN LISPRO 100/ML
VIAL (ML) SUBCUTANEOUS EVERY 4 HOURS
Refills: 0 | Status: DISCONTINUED | OUTPATIENT
Start: 2024-01-24 | End: 2024-01-25

## 2024-01-24 RX ORDER — INSULIN GLARGINE 100 [IU]/ML
22 INJECTION, SOLUTION SUBCUTANEOUS ONCE
Refills: 0 | Status: COMPLETED | OUTPATIENT
Start: 2024-01-24 | End: 2024-01-24

## 2024-01-24 RX ORDER — LISINOPRIL 2.5 MG/1
40 TABLET ORAL DAILY
Refills: 0 | Status: DISCONTINUED | OUTPATIENT
Start: 2024-01-24 | End: 2024-01-24

## 2024-01-24 RX ORDER — DEXTROSE 50 % IN WATER 50 %
15 SYRINGE (ML) INTRAVENOUS ONCE
Refills: 0 | Status: DISCONTINUED | OUTPATIENT
Start: 2024-01-24 | End: 2024-01-27

## 2024-01-24 RX ORDER — LABETALOL HCL 100 MG
10 TABLET ORAL ONCE
Refills: 0 | Status: COMPLETED | OUTPATIENT
Start: 2024-01-24 | End: 2024-01-24

## 2024-01-24 RX ORDER — INSULIN LISPRO 100/ML
8 VIAL (ML) SUBCUTANEOUS ONCE
Refills: 0 | Status: COMPLETED | OUTPATIENT
Start: 2024-01-24 | End: 2024-01-24

## 2024-01-24 RX ORDER — NICARDIPINE HYDROCHLORIDE 30 MG/1
5 CAPSULE, EXTENDED RELEASE ORAL
Qty: 40 | Refills: 0 | Status: DISCONTINUED | OUTPATIENT
Start: 2024-01-24 | End: 2024-01-24

## 2024-01-24 RX ORDER — INSULIN GLARGINE 100 [IU]/ML
22 INJECTION, SOLUTION SUBCUTANEOUS AT BEDTIME
Refills: 0 | Status: DISCONTINUED | OUTPATIENT
Start: 2024-01-25 | End: 2024-01-25

## 2024-01-24 RX ORDER — DEXTROSE 50 % IN WATER 50 %
25 SYRINGE (ML) INTRAVENOUS ONCE
Refills: 0 | Status: DISCONTINUED | OUTPATIENT
Start: 2024-01-24 | End: 2024-01-27

## 2024-01-24 RX ORDER — ACETAMINOPHEN 500 MG
650 TABLET ORAL EVERY 6 HOURS
Refills: 0 | Status: DISCONTINUED | OUTPATIENT
Start: 2024-01-24 | End: 2024-01-27

## 2024-01-24 RX ORDER — LISINOPRIL 2.5 MG/1
40 TABLET ORAL DAILY
Refills: 0 | Status: DISCONTINUED | OUTPATIENT
Start: 2024-01-24 | End: 2024-01-25

## 2024-01-24 RX ORDER — AMLODIPINE BESYLATE 2.5 MG/1
10 TABLET ORAL DAILY
Refills: 0 | Status: DISCONTINUED | OUTPATIENT
Start: 2024-01-25 | End: 2024-01-25

## 2024-01-24 RX ORDER — INSULIN LISPRO 100/ML
VIAL (ML) SUBCUTANEOUS AT BEDTIME
Refills: 0 | Status: DISCONTINUED | OUTPATIENT
Start: 2024-01-24 | End: 2024-01-26

## 2024-01-24 RX ORDER — INFLUENZA VIRUS VACCINE 15; 15; 15; 15 UG/.5ML; UG/.5ML; UG/.5ML; UG/.5ML
0.5 SUSPENSION INTRAMUSCULAR ONCE
Refills: 0 | Status: DISCONTINUED | OUTPATIENT
Start: 2024-01-24 | End: 2024-01-27

## 2024-01-24 RX ORDER — LISINOPRIL 2.5 MG/1
40 TABLET ORAL ONCE
Refills: 0 | Status: DISCONTINUED | OUTPATIENT
Start: 2024-01-24 | End: 2024-01-24

## 2024-01-24 RX ORDER — CARVEDILOL PHOSPHATE 80 MG/1
25 CAPSULE, EXTENDED RELEASE ORAL ONCE
Refills: 0 | Status: DISCONTINUED | OUTPATIENT
Start: 2024-01-24 | End: 2024-01-24

## 2024-01-24 RX ORDER — INSULIN LISPRO 100/ML
VIAL (ML) SUBCUTANEOUS
Refills: 0 | Status: DISCONTINUED | OUTPATIENT
Start: 2024-01-24 | End: 2024-01-24

## 2024-01-24 RX ORDER — DEXTROSE 50 % IN WATER 50 %
12.5 SYRINGE (ML) INTRAVENOUS ONCE
Refills: 0 | Status: DISCONTINUED | OUTPATIENT
Start: 2024-01-24 | End: 2024-01-27

## 2024-01-24 RX ORDER — CARVEDILOL PHOSPHATE 80 MG/1
25 CAPSULE, EXTENDED RELEASE ORAL EVERY 12 HOURS
Refills: 0 | Status: DISCONTINUED | OUTPATIENT
Start: 2024-01-24 | End: 2024-01-25

## 2024-01-24 RX ADMIN — Medication 4: at 21:42

## 2024-01-24 RX ADMIN — Medication 1 APPLICATION(S): at 21:59

## 2024-01-24 RX ADMIN — Medication 10 MILLIGRAM(S): at 14:59

## 2024-01-24 RX ADMIN — NICARDIPINE HYDROCHLORIDE 25 MG/HR: 30 CAPSULE, EXTENDED RELEASE ORAL at 16:26

## 2024-01-24 RX ADMIN — INSULIN GLARGINE 22 UNIT(S): 100 INJECTION, SOLUTION SUBCUTANEOUS at 19:39

## 2024-01-24 RX ADMIN — Medication 8 UNIT(S): at 15:00

## 2024-01-24 RX ADMIN — Medication 8: at 19:50

## 2024-01-24 RX ADMIN — AMLODIPINE BESYLATE 10 MILLIGRAM(S): 2.5 TABLET ORAL at 12:52

## 2024-01-24 NOTE — PATIENT PROFILE ADULT - FUNCTIONAL ASSESSMENT - BASIC MOBILITY 5.
Patient only has 4 days left on her lexapro and she did schedule with Dr. Campos on 12/22/20 at 5:20p, If you could send a refill  To pharmacy to get her through to that appointment.      
07-Nov-2022 11:09
4 = No assist / stand by assistance

## 2024-01-24 NOTE — PROGRESS NOTE ADULT - ASSESSMENT
===Assessment===  48M with PMHx of HTN and DM2 presenting for 1 week of penile dysuria w/ discharge, found to be in hypertensive urgency/emergency, now s/p nicardipine gtt.     ==NEURO==  -Currently A&O x 3    ==CARDIOVASCULAR==  #Hypertensive urgency/emergency   -On admission BP of 247/150  -received HCTZ 25 x1, amlodipine 10 x1, labetalol 10 x1  -s/p nicardipine gtt w/ improvement of SBP to 150-160s  -Restarted home meds: Lisinopril 40mg QD, amlodipine 10 mg QD, hydrochlorothiazide 25 mg, carvedilol 25 mg BID  -Would keep SBP at goal of 160 in first 24 hours, can then resume home medications to further optimize BP    ==PULMONARY==  -no active issues, oxygenating well on RA    ==RENAL/==  #Dysuria w/ discharge  -gonorrhea, chlamydia, syphilis, HIV negative  -UA neg, f/u ucx    #Pseudo hyponatremia  -corrects to 137  -trend Na    ==GASTROINTESTINAL==  #Transaminitis  Hx of alcohol use disorder vs. fatty liver  -RUQ US  -Follow repeat hepatic function panel     ==ENDOCRINE==  #Hyperglycemia in setting of poorly controlled Diabetes Mellitus   -BG 500s on admission, now 300s; labs not suggestive of DKA  -ISS and lantus for now, will add mealtime insulin as well to obtain inpatient BG goal of 140-180  -f/u A1c     ==INFECTIOUS==  #Penile discharge   -No fever, leukocytosis; STI testing negative so far   -Watch off antibiotics     ==HEMATOLOGIC==  -No active issues     Dispo:    Patient requires continuous monitoring with bedside rhythm monitoring, arterial line, pulse oximetry, ventilator monitoring and intermittent blood gas analysis.  Care plan discussed with ICU care team.  I have spent 35 minutes providing critical care, in addition to initial critical time provided by CICU attending Dr. Corado, re-evaluated multiple times during the day.    Hillary Vázquez PA-C

## 2024-01-24 NOTE — H&P ADULT - NSHPPHYSICALEXAM_GEN_ALL_CORE
T(C): 36.8 (01-24-24 @ 14:22), Max: 37.4 (01-24-24 @ 11:36)  HR: 94 (01-24-24 @ 16:49) (87 - 111)  BP: 170/111 (01-24-24 @ 16:49) (170/111 - 247/150)  RR: 18 (01-24-24 @ 16:49) (16 - 20)  SpO2: 97% (01-24-24 @ 16:49) (96% - 98%)    PHYSICAL EXAM:  GENERAL: NAD, well-groomed, well-developed  HEAD:  Atraumatic, Normocephalic  EYES: EOMI, PERRLA, conjunctiva and sclera clear, no jaundice   ENMT: No tonsillar erythema, exudates, or enlargement; Moist mucous membranes  NECK: Supple, non tender, No JVD, Normal thyroid  HEART: Regular rate and rhythm; No murmurs, rubs, or gallops. S1/S2 present  RESPIRATORY: CTA B/L, No W/R/R  ABDOMEN: Soft, Nontender, Nondistended; Bowel sounds present. No hepatosplenomegally  NEUROLOGY: A&Ox3, nonfocal, moving all extremities,  EXTREMITIES:  2+ Peripheral Pulses, No clubbing, cyanosis, or edema. 5/5 muscle tone in UE/LE  SKIN: warm, dry, normal color, no rash or abnormal lesions T(C): 36.8 (01-24-24 @ 14:22), Max: 37.4 (01-24-24 @ 11:36)  HR: 94 (01-24-24 @ 16:49) (87 - 111)  BP: 170/111 (01-24-24 @ 16:49) (170/111 - 247/150)  RR: 18 (01-24-24 @ 16:49) (16 - 20)  SpO2: 97% (01-24-24 @ 16:49) (96% - 98%)    PHYSICAL EXAM:  GENERAL: NAD, well-groomed, obese  HEAD:  Atraumatic, Normocephalic  EYES: EOMI, PERRLA, conjunctiva and sclera clear, no jaundice   ENMT: No tonsillar erythema, exudates, or enlargement; Moist mucous membranes  NECK: Supple, non tender, No JVD, Normal thyroid  HEART: Regular rate and rhythm; No murmurs, rubs, or gallops. S1/S2 present  RESPIRATORY: CTA B/L, No W/R/R  ABDOMEN: Soft, Nontender, distended; Bowel sounds present.   NEUROLOGY: A&Ox3, nonfocal, moving all extremities  EXTREMITIES:  2+ Peripheral Pulses, No clubbing, cyanosis, or edema.  SKIN: warm, dry, normal color, no rash or abnormal lesions  : Grossly erythematous penis and testicles. Some bloody discharge at meatus.

## 2024-01-24 NOTE — ED PROVIDER NOTE - NS ED ROS FT
CONST: no fevers, no chills  EYES: no redness, no vision changes   HENT: no throat pain, no epistaxis  CV: no chest pain, no palpitations     RESP: no shortness of breath, no cough  ABD: no abdominal pain, no vomiting     : (+) dysuria, no hematuria   MSK: no muscle pain, no joint swelling     NEURO: no headache, no focal weakness or loss of sensation     SKIN:  no rash, no lacerations.

## 2024-01-24 NOTE — H&P ADULT - ATTENDING COMMENTS
Hypertensive urgency - transition to PO regimen  UTI - follow-up cultures and treat accordingly  Concern for DKA - monitor labs, consider insulin gtt and IV fluids

## 2024-01-24 NOTE — H&P ADULT - ASSESSMENT
Patient is 48y Male with PMHx of HTN and DM2 presenting for 1 week of penile dysuria w/ discharge, found to be in hypertensive crisis, started on nicardipine gtt.    NEURO:  #Mental status:  -Currently A&O x 3    CV:  #Hypertensive crisis  -On admission BP of 247/150  -received HCTZ 25 x1, amlodipine 10 x1, labetalol 10 x1  -s/p nicardipine gtt w/ improvement to 170/111  -on home meds: Lisinopril 40mg QD, amlodipine 10 mg QD, hydrochlorothiazide 25 mg, carvedilol 25 mg BID  -c/w home meds w/ hold parameters    PULM:  -no active issues    RENAL/:  #Dysuria w/ discharge  -gonorrhea, chlamydia, syphilis, HIV  -UA, ucx    GI:  #Transaminitis  -RUQ US  -Trend AST/ALT    #Diet: DASH      ENDO:  #Uncontrolled Type 2 DM  -Pt hyperglycemic  -ISS  -not on home insulin  -A1c    METABOLIC:  #Pseudo hyponatremia  -corrects to 137  -trend Na  -ISS    HEMATOLOGIC:  -H/H stable    ID:  -w/u for dysuria/penile discharge as above  -trend WBC, temp    SKIN:  -no active issues       Patient is 48y Male with PMHx of HTN and DM2 presenting for 1 week of penile dysuria w/ discharge, found to be in hypertensive crisis, started on nicardipine gtt.    NEURO:  #Mental status:  -Currently A&O x 3    CV:  #Hypertensive crisis  -On admission BP of 247/150  -received HCTZ 25 x1, amlodipine 10 x1, labetalol 10 x1  -s/p nicardipine gtt w/ improvement to 170/111  -on home meds: Lisinopril 40mg QD, amlodipine 10 mg QD, hydrochlorothiazide 25 mg, carvedilol 25 mg BID  -c/w home meds w/ hold parameters    PULM:  -no active issues    RENAL/:  #Dysuria w/ discharge  -gonorrhea, chlamydia, syphilis, HIV negative  -UA neg, f/u ucx  -consider sending swab of discharge    GI:  #Transaminitis  -RUQ US  -Trend AST/ALT    #Diet: DASH      ENDO:  #Uncontrolled Type 2 DM  -Pt hyperglycemic w/ BHB and ketones, no anion gap  -dosed lantus based on 0.3cc/kg as naiive to insulin-> lantus 22u now  -ISS  -not on home insulin  -f/u A1c    METABOLIC:  #Pseudo hyponatremia  -corrects to 137  -trend Na  -ISS    HEMATOLOGIC:  -H/H stable    ID:  -w/u for dysuria/penile discharge as above  -trend WBC, temp    SKIN:  -no active issues       Patient is 48y Male with PMHx of HTN and DM2 presenting for 1 week of penile dysuria w/ discharge, found to be in hypertensive crisis, started on nicardipine gtt.    NEURO:  #Mental status:  -Currently A&O x 3    CV:  #Hypertensive crisis  -On admission BP of 247/150  -received HCTZ 25 x1, amlodipine 10 x1, labetalol 10 x1  -s/p nicardipine gtt w/ improvement to 170/111  -on home meds: Lisinopril 40mg QD, amlodipine 10 mg QD, hydrochlorothiazide 25 mg, carvedilol 25 mg BID  -c/w home meds w/ hold parameters  -consider IV hydral PRN    PULM:  -no active issues    RENAL/:  #Dysuria w/ discharge  -gonorrhea, chlamydia, syphilis, HIV negative  -UA neg, f/u ucx  -consider sending swab of discharge    GI:  #Transaminitis  Hx of alcohol use disorder vs. fatty liver  -RUQ US  -Trend AST/ALT    #Diet: DASH      ENDO:  #Uncontrolled Type 2 DM  -Pt hyperglycemic w/ BHB and ketones, no anion gap  -dosed lantus based on 0.3cc/kg as naiive to insulin-> lantus 22u now  -fingersticks q4  -ISS  -not on home insulin  -f/u A1c    METABOLIC:  #Pseudo hyponatremia  -corrects to 137  -trend Na  -ISS    HEMATOLOGIC:  -H/H stable    ID:  -w/u for dysuria/penile discharge as above  -trend WBC, temp

## 2024-01-24 NOTE — H&P ADULT - HISTORY OF PRESENT ILLNESS
49 yo M w/ PMHx of HTN and DM2 presents for 1 week of penile pain associated with dysuria and localized swelling. Pt stated that he recently ran out of his home medications as he does not have health insurance presently. He states that he has 1 sexual partner, his wife.  He stated that he bought an over-the-counter medication clotrimazole cream and has been applying it with some relief, however continues to have pain.  He does not have insurance as it  and he is currently not working so he has been off his antihypertensive medication.  Pt denied chest pain, SOB, headache, diplopia or other changes.  He states that he intermediately notices there is white cottage like discharge from the penile region.      In ED, pt   	Home Medications: Lisinopril 40mg QD, amlodipine 10 mg QD, hydrochlorothiazide 25 mg, carvedilol 25 mg BID 47 yo M w/ PMHx of HTN and DM2 presents for 1 week of penile pain associated with dysuria and localized swelling. Pt stated that he recently ran out of his home medications as he does not have health insurance presently. He states that he has 1 sexual partner, his wife.  He stated that he bought an over-the-counter medication clotrimazole cream and has been applying it with some relief, however continues to have pain.  He does not have insurance as it  and he is currently not working so he has been off his antihypertensive medication.  Pt denied chest pain, SOB, headache, diplopia or other changes.  He states that he intermediately notices there is white cottage like discharge from the penile region.      In ED, pt was found to have BP of 247/150 and hyperglycemic to 393. He received HCTZ 25 x1, amlodipine 10 x1, labetalol 10 x1, 8u admelog, nicardipine gtt.  	  Home Medications: Lisinopril 40mg QD, amlodipine 10 mg QD, hydrochlorothiazide 25 mg, carvedilol 25 mg BID 49 yo M w/ PMHx of HTN presents for 1 week of penile pain associated with dysuria and localized swelling. Pt stated that he ran out of his home medications as he does not have health insurance presently. He states that he has 1 sexual partner, his wife.  He stated that he bought an over-the-counter medication clotrimazole cream and has been applying it with some relief, however continues to have pain.  He does not have insurance as it  and he is currently not working so he has been off his antihypertensive medication.  Pt denied chest pain, SOB, headache, diplopia or other changes.  He states that he intermediately notices there is white discharge from the penile region. Pt has not taken medications for over 1 year, has not seen doctors for 1-2 years.     In ED, pt was found to have BP of 247/150 and hyperglycemic to 393. He received HCTZ 25 x1, amlodipine 10 x1, labetalol 10 x1, 8u admelog, nicardipine gtt.  	  Home Medications: Lisinopril 40mg QD, amlodipine 10 mg QD, hydrochlorothiazide 25 mg, carvedilol 25 mg BID

## 2024-01-24 NOTE — ED PROVIDER NOTE - PROGRESS NOTE DETAILS
Patient found to have LVH on EKG, STEMI appearing EKG but no active chest pain and consistent with prior EKG. Cardiology team consulted, who recommended CICU consult CICU fellow at bedside. Given patient's BP persists despite 10 mg IVP labetalol, will require CICU admission for BP monitoring in setting of hypertensive emergency.

## 2024-01-24 NOTE — PATIENT PROFILE ADULT - FUNCTIONAL ASSESSMENT - DAILY ACTIVITY 3.
1.  Please continue  birth control precautions without exception, every time you have intercourse,  until further notice    2. Proceed with plans for voluntary sterilization -- bilateral vasectomy     3.   NO aspirin or NSAIDs (medications such as Advil, M 4 = No assist / stand by assistance

## 2024-01-24 NOTE — H&P ADULT - NSHPSOCIALHISTORY_GEN_ALL_CORE
Has 8 year hx of smoking, quit 7 years ago, smoked 5 cigarettes daily. Around holidays, he smoked 1 cigarette. He has hx of binge drinking on occasions. Around the holidays, he would drink 12 beer cans and a shot of liquor with company. Lives w/ wife at home. Denied illicit drug use. From [facility], has lived here for 27 years, No hx of STI, unsure about his wife's STI hx. Stated he is only sexually active with his wife.

## 2024-01-24 NOTE — CONSULT NOTE ADULT - SUBJECTIVE AND OBJECTIVE BOX
Patient seen and evaluated at bedside      HPI:  49 yo M with a history of HTN presents to the ED with 1 week of penile pain, cardiology consulted for elevated BP of 247/150.    Cardiology was consulted for an elevated BP of 247/150 in the ED. Patient states that he does not have insurance since it , and as he is currently unemployed he has been off his antihypertensive mediations. At the time and presently, patient denies any CP, SOB, headache, n/v/d. Patient was restarted on 2 of his 4 home meds (amlodipine 10 mg QD, hydrochlorothiazide 25 mg) prior to consult without adequate BP response.    Of note, patient has been having ongoing chest pain for the last several years suggestive of stable angina and had been seen previously by cardiology. Previous ECG  consistent with LVH and secondary ST changes, although cardiac biomarkers during previous hospitalizations are unremarkable.      PMHx:   Hypertension  Alcohol dependence    PSHx:     Allergies:  No Known Allergies    Home Meds:  - Lisinopril 40mg QD  - Amlodipine 10 mg QD  - Hydrochlorothiazide 25 mg  - Carvedilol 25 mg BID    Current Medications:   insulin lispro Injectable (ADMELOG) 8 Unit(s) SubCutaneous Once  labetalol Injectable 10 milliGRAM(s) IV Push once    FAMILY HISTORY:  Family history of stroke (Mother, Sibling)    Social History:  Smoking History:  Alcohol Use:  Drug Use:    REVIEW OF SYSTEMS:  CONSTITUTIONAL: No weakness, fevers or chills  EYES/ENT: No visual changes;  No dysphagia  NECK: No pain or stiffness  RESPIRATORY: No cough, wheezing, hemoptysis; No shortness of breath  CARDIOVASCULAR: No chest pain or palpitations; No lower extremity edema  GASTROINTESTINAL: No abdominal or epigastric pain. No nausea, vomiting, or hematemesis; No diarrhea or constipation. No melena or hematochezia.  BACK: No back pain  GENITOURINARY: + dysuria. No frequency or hematuria  NEUROLOGICAL: No numbness or weakness  SKIN: No itching, burning, rashes, or lesions   All other review of systems is negative unless indicated above.    Physical Exam:  T(F): 98.3 (), Max: 99.4 ()  HR: 87 () (87 - 111)  BP: 221/150 () (221/150 - 247/150)  RR: 17 ()  SpO2: 97% ()  GENERAL: No acute distress, well-developed  HEAD:  Atraumatic, Normocephalic  ENT: EOMI, PERRLA, conjunctiva and sclera clear, Neck supple, No JVD, moist mucosa  CHEST/LUNG: Clear to auscultation bilaterally; No wheeze, equal breath sounds bilaterally   BACK: No spinal tenderness  HEART: Regular rate and rhythm; No murmurs, rubs, or gallops  ABDOMEN: Soft, Nontender, Nondistended; Bowel sounds present  EXTREMITIES:  No clubbing, cyanosis, or edema  PSYCH: Nl behavior, nl affect  NEUROLOGY: AAOx3, non-focal, cranial nerves intact  SKIN: Normal color, No rashes or lesions    Cardiovascular Diagnostic Testing:    ECG: Personally reviewed:    Echo: Personally reviewed:    Stress Testing:    Cath:    Imaging:    CXR: Personally reviewed    Labs: Personally reviewed                        14.2   5.85  )-----------( 233      ( 2024 13:57 )             42.7         132<L>  |  92<L>  |  12  ----------------------------<  528<HH>  4.1   |  25  |  0.61    Ca    9.8      2024 13:57    TPro  7.9  /  Alb  4.3  /  TBili  0.8  /  DBili  x   /  AST  159<H>  /  ALT  162<H>  /  AlkPhos  168<H>      CARDIAC MARKERS ( 2024 13:57 )  15 ng/L / x     / x     / x     / x     / x

## 2024-01-24 NOTE — H&P ADULT - NSHPREVIEWOFSYSTEMS_GEN_ALL_CORE
REVIEW OF SYSTEMS:    CONSTITUTIONAL: No weakness, fevers or chills  EYES/ENT: No visual changes;  No vertigo or throat pain   NECK: No pain or stiffness  RESPIRATORY: No cough, wheezing, hemoptysis; No shortness of breath  CARDIOVASCULAR: No chest pain or palpitations  GASTROINTESTINAL: No abdominal or epigastric pain. No nausea, vomiting, or hematemesis; No diarrhea or constipation. No melena or hematochezia.  GENITOURINARY: +dysuria. No frequency or hematuria  NEUROLOGICAL: No numbness or weakness  SKIN: No itching, rashes

## 2024-01-24 NOTE — ED PROVIDER NOTE - CLINICAL SUMMARY MEDICAL DECISION MAKING FREE TEXT BOX
Patient presents to emergency department for genitourinary complaints and based on physical exam is consistent with candidal balanoposthitis.     Given that the patient arrived with a systolic blood pressure of greater than 240s, patient likely having hypertensive urgency in setting of running out of his chronic hypertensive medication control.  Will initiate his home dose medications of amlodipine 10 mg as well as his hydrochlorothiazide at this time and attempt to gradually lower his blood pressure.  Will also obtain EKG and Trope and CMP to assess for any evidence of endorgan damage and setting of his hypertension.

## 2024-01-24 NOTE — ED PROVIDER NOTE - PHYSICAL EXAMINATION
GEN: Pt in NAD, A&O x3. GCS 15  EYES: Sclera white w/o injection, PERRLA, EOMI.  ENT: Head NCAT. Nose without deformity, turbinates without edema or erythema, no DC. No auricular TTP. Mouth and pharynx without erythema or exudates, uvula midline, no tonsillar enlargement. Neck supple FROM.   RESP: No chest wall tenderness, CTA b/l, no wheezes, rales, or rhonchi.   CARDIAC: RRR, clear distinct S1, S2, no murmurs, gallops, or rubs.   ABD: Abdomen non-distended, soft, non-tender, no rebound or guarding. No CVAT b/l.  : phallus uncircumcised, foreskin edematous appearing, discharge at urethral meatus that is clear appearing. No tenderness to palpation along the scrotal region. Chaperoned by Dr. Juan Ordonez.  VASC: 2+ carotid, radial, and dorsalis pedis pulses b/l. No edema or tenderness of the lower extremities.  NEURO: CN 2-12 grossly intact. Normal and equal sensation UE, LE and face b/l. 5/5 strength UE and LE b/l.  Pronator drift negative. Normal gait and gross cerebellar functioning.  MSK: No joint erythema or obvious deformities. Spine without obvious deformity. No midline or paraspinal tenderness. FROM w/o pain of upper and lower extremities b/l.  SKIN: No rashes noted.

## 2024-01-24 NOTE — PROGRESS NOTE ADULT - SUBJECTIVE AND OBJECTIVE BOX
Patient is a 48y old  Male who presents with a chief complaint of     INTERVAL HISTORY:  -     SUBJECTIVE  - Patient seen and evaluated at bedside.     MEDICATIONS:  MEDICATIONS  (STANDING):  carvedilol 25 milliGRAM(s) Oral every 12 hours  clotrimazole 1% Cream 1 Application(s) Topical every 12 hours  dextrose 50% Injectable 25 Gram(s) IV Push once  dextrose 50% Injectable 25 Gram(s) IV Push once  dextrose 50% Injectable 12.5 Gram(s) IV Push once  dextrose Oral Gel 15 Gram(s) Oral once  glucagon  Injectable 1 milliGRAM(s) IntraMuscular once  hydrochlorothiazide 25 milliGRAM(s) Oral daily  influenza   Vaccine 0.5 milliLiter(s) IntraMuscular once  insulin glargine Injectable (LANTUS) 22 Unit(s) SubCutaneous once  insulin lispro (ADMELOG) corrective regimen sliding scale   SubCutaneous at bedtime  insulin lispro (ADMELOG) corrective regimen sliding scale   SubCutaneous every 4 hours  lisinopril 40 milliGRAM(s) Oral daily  niCARdipine Infusion 5 mG/Hr (25 mL/Hr) IV Continuous <Continuous>    MEDICATIONS  (PRN):  acetaminophen     Tablet .. 650 milliGRAM(s) Oral every 6 hours PRN Temp greater or equal to 38C (100.4F), Moderate Pain (4 - 6)    OBJECTIVE:  ICU Vital Signs Last 24 Hrs  T(C): 36.9 (24 Jan 2024 17:56), Max: 37.4 (24 Jan 2024 11:36)  T(F): 98.4 (24 Jan 2024 17:56), Max: 99.4 (24 Jan 2024 11:36)  HR: 93 (24 Jan 2024 18:30) (87 - 111)  BP: 190/105 (24 Jan 2024 18:30) (170/111 - 247/150)  BP(mean): 140 (24 Jan 2024 18:30) (126 - 145)  RR: 17 (24 Jan 2024 18:30) (16 - 25)  SpO2: 95% (24 Jan 2024 18:30) (95% - 98%)    O2 Parameters below as of 24 Jan 2024 18:30  Patient On (Oxygen Delivery Method): room air    CAPILLARY BLOOD GLUCOSE  POCT Blood Glucose.: 313 mg/dL (24 Jan 2024 15:43)  POCT Blood Glucose.: 393 mg/dL (24 Jan 2024 14:57)    CAPILLARY BLOOD GLUCOSE      POCT Blood Glucose.: 313 mg/dL (24 Jan 2024 15:43)    I&O's Summary    Daily Height in cm: 170.18 (24 Jan 2024 17:56)      PHYSICAL EXAM:  General: NAD, well-groomed, well-developed  Eyes: Conjunctiva and sclera clear  ENMT: Moist mucous membranes  Neck: Supple  Chest: Clear to auscultation bilaterally; no rales, rhonchi, or wheezing  Heart: Regular rate and rhythm; normal S1 and S2; no murmurs, rubs, or gallops  Abd: Soft, nontender, nondistended  Nervous System: AAOX3  Ext: no peripheral LE edema bilaterally    LABS:                        14.2   5.85  )-----------( 233      ( 24 Jan 2024 13:57 )             42.7     01-24    132<L>  |  92<L>  |  12  ----------------------------<  528<HH>  4.1   |  25  |  0.61    Ca    9.8      24 Jan 2024 13:57    TPro  7.9  /  Alb  4.3  /  TBili  0.8  /  DBili  x   /  AST  159<H>  /  ALT  162<H>  /  AlkPhos  168<H>  01-24    LIVER FUNCTIONS - ( 24 Jan 2024 13:57 )  Alb: 4.3 g/dL / Pro: 7.9 g/dL / ALK PHOS: 168 U/L / ALT: 162 U/L / AST: 159 U/L / GGT: x           Urinalysis Basic - ( 24 Jan 2024 13:57 )    Color: Yellow / Appearance: Clear / SG: >1.030 / pH: x  Gluc: 528 mg/dL / Ketone: 15 mg/dL  / Bili: Negative / Urobili: 0.2 mg/dL   Blood: x / Protein: 30 mg/dL / Nitrite: Negative   Leuk Esterase: Negative / RBC: 5 /HPF / WBC 4 /HPF   Sq Epi: x / Non Sq Epi: 3 /HPF / Bacteria: Negative /HPF          Plan:  ====================== NEUROLOGY=====================  - continue to monitor mental status as per protocol     ==================== RESPIRATORY======================  - continue to monitor SpO2 with goal >94%    Mechanical Vent:     ====================CARDIOVASCULAR==================      ===================== RENAL =========================  - Continue monitoring urine output, lytes, SCr/ BUN  - replete lytes prn with goal K >4 and Mg >2    =============== GASTROINTESTINAL===================      ===================ENDO====================      ===================HEMATOLOGIC/ONC ===================  - Monitor H/H and plts  - DVT PPX:     ==================INFECTIOUS DISEASE================  - monitor and trend WBC and temperature curve     Dispo:    Patient requires continuous monitoring with bedside rhythm monitoring, arterial line, pulse oximetry, ventilator monitoring and intermittent blood gas analysis.  Care plan discussed with ICU care team.  I have spent 35 minutes providing critical care, in addition to initial critical time provided by CICU attending Dr. Corado, re-evaluated multiple times during the day.    Hillary Vázquez PA-C   INTERVAL HISTORY:  - Presented as HTN urgency, now off cardene gtt with SBP at goal for first 24 hours.     SUBJECTIVE  - Patient seen and evaluated at bedside. No CP, SOB, pain, palpitations.     MEDICATIONS:  MEDICATIONS  (STANDING):  carvedilol 25 milliGRAM(s) Oral every 12 hours  clotrimazole 1% Cream 1 Application(s) Topical every 12 hours  dextrose 50% Injectable 25 Gram(s) IV Push once  dextrose 50% Injectable 25 Gram(s) IV Push once  dextrose 50% Injectable 12.5 Gram(s) IV Push once  dextrose Oral Gel 15 Gram(s) Oral once  glucagon  Injectable 1 milliGRAM(s) IntraMuscular once  hydrochlorothiazide 25 milliGRAM(s) Oral daily  influenza   Vaccine 0.5 milliLiter(s) IntraMuscular once  insulin glargine Injectable (LANTUS) 22 Unit(s) SubCutaneous once  insulin lispro (ADMELOG) corrective regimen sliding scale   SubCutaneous at bedtime  insulin lispro (ADMELOG) corrective regimen sliding scale   SubCutaneous every 4 hours  lisinopril 40 milliGRAM(s) Oral daily  niCARdipine Infusion 5 mG/Hr (25 mL/Hr) IV Continuous <Continuous>    MEDICATIONS  (PRN):  acetaminophen     Tablet .. 650 milliGRAM(s) Oral every 6 hours PRN Temp greater or equal to 38C (100.4F), Moderate Pain (4 - 6)    OBJECTIVE:  ICU Vital Signs Last 24 Hrs  T(C): 36.9 (24 Jan 2024 17:56), Max: 37.4 (24 Jan 2024 11:36)  T(F): 98.4 (24 Jan 2024 17:56), Max: 99.4 (24 Jan 2024 11:36)  HR: 93 (24 Jan 2024 18:30) (87 - 111)  BP: 190/105 (24 Jan 2024 18:30) (170/111 - 247/150)  BP(mean): 140 (24 Jan 2024 18:30) (126 - 145)  RR: 17 (24 Jan 2024 18:30) (16 - 25)  SpO2: 95% (24 Jan 2024 18:30) (95% - 98%)    O2 Parameters below as of 24 Jan 2024 18:30  Patient On (Oxygen Delivery Method): room air    CAPILLARY BLOOD GLUCOSE  POCT Blood Glucose.: 313 mg/dL (24 Jan 2024 15:43)  POCT Blood Glucose.: 393 mg/dL (24 Jan 2024 14:57)    CAPILLARY BLOOD GLUCOSE      POCT Blood Glucose.: 313 mg/dL (24 Jan 2024 15:43)    I&O's Summary    Daily Height in cm: 170.18 (24 Jan 2024 17:56)      PHYSICAL EXAM:  General: NAD, well-groomed, well-developed  Eyes: Conjunctiva and sclera clear  ENMT: Moist mucous membranes  Neck: Supple  Chest: Clear to auscultation bilaterally; no rales, rhonchi, or wheezing  Heart: Regular rate and rhythm; normal S1 and S2; no murmurs, rubs, or gallops  Abd: Soft, nontender, nondistended  Nervous System: AAOX3  Ext: no peripheral LE edema bilaterally    LABS:                        14.2   5.85  )-----------( 233      ( 24 Jan 2024 13:57 )             42.7     01-24    132<L>  |  92<L>  |  12  ----------------------------<  528<HH>  4.1   |  25  |  0.61    Ca    9.8      24 Jan 2024 13:57    TPro  7.9  /  Alb  4.3  /  TBili  0.8  /  DBili  x   /  AST  159<H>  /  ALT  162<H>  /  AlkPhos  168<H>  01-24    LIVER FUNCTIONS - ( 24 Jan 2024 13:57 )  Alb: 4.3 g/dL / Pro: 7.9 g/dL / ALK PHOS: 168 U/L / ALT: 162 U/L / AST: 159 U/L / GGT: x           Urinalysis Basic - ( 24 Jan 2024 13:57 )    Color: Yellow / Appearance: Clear / SG: >1.030 / pH: x  Gluc: 528 mg/dL / Ketone: 15 mg/dL  / Bili: Negative / Urobili: 0.2 mg/dL   Blood: x / Protein: 30 mg/dL / Nitrite: Negative   Leuk Esterase: Negative / RBC: 5 /HPF / WBC 4 /HPF   Sq Epi: x / Non Sq Epi: 3 /HPF / Bacteria: Negative /HPF

## 2024-01-24 NOTE — ED ADULT NURSE NOTE - NSFALLUNIVINTERV_ED_ALL_ED
Bed/Stretcher in lowest position, wheels locked, appropriate side rails in place/Call bell, personal items and telephone in reach/Instruct patient to call for assistance before getting out of bed/chair/stretcher/Non-slip footwear applied when patient is off stretcher/[place] to call system/Physically safe environment - no spills, clutter or unnecessary equipment/Purposeful proactive rounding/Room/bathroom lighting operational, light cord in reach

## 2024-01-24 NOTE — ED PROVIDER NOTE - ATTENDING CONTRIBUTION TO CARE
I, Juan Ordonez MD, Emergency Medicine Attending Physician, personally saw and examined the patient and I personally made/approve the management plan and take responsibility for the patient management.    MDM: 48-year-old male with history of hypertension, who presents with penile/genital pain and burning with urination, and swelling of the penis.  Patient states he is only sexually active with his wife, and has no concern for STI.  Patient has been taking clotrimazole 1% cream with no improvement.  Patient states he has not been taking his antihypertensives for 1 month because he does not currently have insurance and is not working at this time.    ROS: Denies fevers, chills, chest pain, shortness of breath, abdominal pain, flank pain, oliguria, hematuria, visual changes, headache, numbness, weakness, vomiting.    On examination, patient with significantly elevated blood pressure but otherwise stable vitals, well-appearing, nontoxic, in no acute distress.  Eyes PERRLA, EOMI, visual fields intact.  Cardiac examination RRR with no M/R/G, lungs CTAB with no wheezing/rales/rhonchi, abdomen soft and nontender, nondistended, no peritoneal signs, no CVA tenderness.  NEURO: AAOx3, Cranial nerves III-XII intact. Strength intact with 5/5 strength in all 4 extremities. Sensation intact to light touch in all 4 extremities. No pronator drift. Normal speech and gait.  Chaperoned genital examination showed penis is uncircumcised and foreskin is edematous and erythematous at the distal aspects with clear discharge from the urethral meatus, and erythema of the glans.  No scrotal/testicular tenderness or edema, normal cremasteric reflex.    Will obtain labs to evaluate for hematologic disorder, metabolic derangements, hepatic and renal function, and screen for infection.  Will obtain STI panel, patient declining empiric treatment for STI.  Due to significant hypertension, will keep patient on cardiac monitor, obtain EKG and troponin to evaluate for possible cardiac abnormality including ACS and arrhythmia.  Will give patient 2 of his 4 antihypertensive medications since he has been off for 1 month, would not restart all 4 at the same time due to possible overtreatment of blood pressure. I, Juan Ordonez MD, Emergency Medicine Attending Physician, personally saw and examined the patient and I personally made/approve the management plan and take responsibility for the patient management.    MDM: 48-year-old male with history of hypertension, who presents with penile/genital pain and burning with urination, and swelling of the penis.  Patient states he is only sexually active with his wife, and has no concern for STI.  Patient has been taking clotrimazole 1% cream with no improvement.  Patient states he has not been taking his antihypertensives for 1 month because he does not currently have insurance and is not working at this time.    ROS: Denies fevers, chills, chest pain, shortness of breath, abdominal pain, flank pain, oliguria, hematuria, visual changes, headache, numbness, weakness, vomiting.    On examination, patient with significantly elevated blood pressure but otherwise stable vitals, well-appearing, nontoxic, in no acute distress.  Eyes PERRLA, EOMI, visual fields intact.  Cardiac examination RRR with no M/R/G, lungs CTAB with no wheezing/rales/rhonchi, abdomen soft and nontender, nondistended, no peritoneal signs, no CVA tenderness.  NEURO: AAOx3, Cranial nerves III-XII intact. Strength intact with 5/5 strength in all 4 extremities. Sensation intact to light touch in all 4 extremities. No pronator drift. Normal speech and gait.  Chaperoned genital examination showed penis is uncircumcised and foreskin is edematous and erythematous at the distal aspects with clear discharge from the urethral meatus, and erythema of the glans.  No scrotal/testicular tenderness or edema, normal cremasteric reflex.    Will obtain labs to evaluate for hematologic disorder, metabolic derangements, hepatic and renal function, and screen for infection.  Will obtain STI panel, patient declining empiric treatment for STI.  Due to significant hypertension, will keep patient on cardiac monitor, obtain EKG and troponin to evaluate for possible cardiac abnormality including ACS and arrhythmia.  Will first give patient 2 of his 4 antihypertensive medications and monitor blood pressure.    Patient continues to have significantly elevated blood pressure 1 and half hours after medications, will give remaining 2 antihypertensives and reassess. I, Juan Ordonez MD, Emergency Medicine Attending Physician, personally saw and examined the patient and I personally made/approve the management plan and take responsibility for the patient management.    MDM: 48-year-old male with history of hypertension, who presents with penile/genital pain and burning with urination, and swelling of the penis.  Patient states he is only sexually active with his wife, and has no concern for STI.  Patient has been taking clotrimazole 1% cream with no improvement.  Patient states he has not been taking his antihypertensives for 1 month because he does not currently have insurance and is not working at this time.    ROS: Denies fevers, chills, chest pain, shortness of breath, abdominal pain, flank pain, oliguria, hematuria, visual changes, headache, numbness, weakness, vomiting.    On examination, patient with significantly elevated blood pressure but otherwise stable vitals, well-appearing, nontoxic, in no acute distress.  Eyes PERRLA, EOMI, visual fields intact.  Cardiac examination RRR with no M/R/G, lungs CTAB with no wheezing/rales/rhonchi, abdomen soft and nontender, nondistended, no peritoneal signs, no CVA tenderness.  NEURO: AAOx3, Cranial nerves III-XII intact. Strength intact with 5/5 strength in all 4 extremities. Sensation intact to light touch in all 4 extremities. No pronator drift. Normal speech and gait.  Chaperoned genital examination showed penis is uncircumcised and foreskin is edematous and erythematous at the distal aspects with clear discharge from the urethral meatus, and erythema of the glans.  No scrotal/testicular tenderness or edema, normal cremasteric reflex.    Will obtain labs to evaluate for hematologic disorder, metabolic derangements, hepatic and renal function, and screen for infection.  Will obtain STI panel, patient declining empiric treatment for STI.  Due to significant hypertension, will keep patient on cardiac monitor, obtain EKG and troponin to evaluate for possible cardiac abnormality including ACS and arrhythmia.  Will first give patient 2 of his 4 antihypertensive medications and monitor blood pressure.    Patient continues to have significantly elevated blood pressure 1 and half hours after medications, will give IV labetalol.     My independent interpretation of the EKG shows:  Normal sinus rhythm with rate of 84 bpm, with ST elevations noted in aVR and V1 through V3, with ST depressions noted in leads I, II, and V5 V6.  Findings are similar from prior EKG on 8/30/2022 I, Juan Ordonez MD, Emergency Medicine Attending Physician, personally saw and examined the patient and I personally made/approve the management plan and take responsibility for the patient management.    MDM: 48-year-old male with history of hypertension, who presents with penile/genital pain and burning with urination, and swelling of the penis.  Patient states he is only sexually active with his wife, and has no concern for STI.  Patient has been taking clotrimazole 1% cream with no improvement.  Patient states he has not been taking his antihypertensives for 1 month because he does not currently have insurance and is not working at this time.    ROS: Denies fevers, chills, chest pain, shortness of breath, abdominal pain, flank pain, oliguria, hematuria, visual changes, headache, numbness, weakness, vomiting.    On examination, patient with significantly elevated blood pressure but otherwise stable vitals, well-appearing, nontoxic, in no acute distress.  Eyes PERRLA, EOMI, visual fields intact.  Cardiac examination RRR with no M/R/G, lungs CTAB with no wheezing/rales/rhonchi, abdomen soft and nontender, nondistended, no peritoneal signs, no CVA tenderness.  NEURO: AAOx3, Cranial nerves III-XII intact. Strength intact with 5/5 strength in all 4 extremities. Sensation intact to light touch in all 4 extremities. No pronator drift. Normal speech and gait.  Chaperoned genital examination showed penis is uncircumcised and foreskin is edematous and erythematous at the distal aspects with clear discharge from the urethral meatus, and erythema of the glans.  No scrotal/testicular tenderness or edema, normal cremasteric reflex.    Will obtain labs to evaluate for hematologic disorder, metabolic derangements, hepatic and renal function, and screen for infection.  Will obtain STI panel, patient declining empiric treatment for STI.  Due to significant hypertension, will keep patient on cardiac monitor, obtain EKG and troponin to evaluate for possible cardiac abnormality including ACS and arrhythmia.  Will first give patient 2 of his 4 antihypertensive medications and monitor blood pressure.    Patient continues to have significantly elevated blood pressure 1 and half hours after medications, will give IV labetalol.     My independent interpretation of the EKG shows:  Normal sinus rhythm with rate of 84 bpm, with ST elevations noted in aVR and V1 through V3, with ST depressions noted in leads I, II, and V5 V6.  Findings are similar from prior EKG on 8/30/2022.     Cardiology consulted due to abnormal EKG and elevated BP.    Labs reviewed, no leukocytosis, patient with significantly elevated glucose, and sodium corrects to normal range.  Elevated LFT.  VBG showed no acidosis, and there was no anion gap elevation.  Will give patient insulin subcu as there is no evidence of DKA.  Troponin 15.    My independent interpretation of the chest x-ray images shows no focal consolidation.   More details to be seen in the official radiologist read.    Patient responded transiently to labetalol IV push, cardiology recommending nicardipine drip and admission to CICU.    The patient will need to be admitted to the hospital for continued evaluation and management.  Discussed with the accepting physician regarding the initial presentation, diagnostic studies, treatments given in the ED, and current plan of care.   The patient was accepted by and endorsed to the cardiology/CICU team.    Critical Care Billing: Hypertensive emergency  Upon my evaluation, this patient had a high probability of imminent or life-threatening deterioration, which required my direct attention, intervention, and personal management.  The patient has a  medical condition that impairs one or more vital organ systems.  Frequent personal assessment and adjustment of medical interventions was performed.      I have personally provided 35 minutes of critical care time exclusive of time spent on separately billable procedures. Time includes review of laboratory data, radiology results, discussion with consultants, patient and family; monitoring for potential decompensation, as well as time spent retrieving data and reviewing the chart and documenting the visit. Interventions were performed as documented above.

## 2024-01-24 NOTE — ED PROVIDER NOTE - CARE PLAN
Principal Discharge DX:	Candidal balanoposthitis   1 Principal Discharge DX:	Hypertensive emergency without congestive heart failure  Secondary Diagnosis:	Candidal balanoposthitis

## 2024-01-24 NOTE — ED ADULT NURSE NOTE - CAS EDN DISCHARGE INTERVENTIONS
Bedside shift change report given to Cathy Menon RN (oncoming nurse) by Ruth Jerome RN (offgoing nurse). Report included the following information SBAR, Kardex and MAR. IV intact

## 2024-01-24 NOTE — ED ADULT NURSE NOTE - NS ED NURSE TRANSPORT WITH
Cardiac Monitor/Defib/ACLS/Rescue Kit/O2/BVM transported with CICU provider and ED tech/Cardiac Monitor/Defib/ACLS/Rescue Kit/O2/BVM

## 2024-01-24 NOTE — ED PROVIDER NOTE - OBJECTIVE STATEMENT
Patient who has past medical history of hypertension presents to emergency department for 1 week of penile pain associated with dysuria and localized swelling.  He states that he has 1 sexual partner his wife.  He states that he bought an over-the-counter medication clotrimazole cream and has been applying it with some relief, however continues to have pain.  He does not have insurance as it  and he is currently not working so he has been off his antihypertensive medication.  Currently he is not having any chest pain shortness of breath headache diplopia or other changes.  He states that he intermediately notices there is white cottage like discharge from the penile region.  Patient speaks [place] only and  [id number] was used for this encounter    Medications: Lisinopril 40mg, amlodipine 10 mg, hydrochlorothiazide 25 mg. Patient who has past medical history of hypertension presents to emergency department for 1 week of penile pain associated with dysuria and localized swelling.  He states that he has 1 sexual partner his wife.  He states that he bought an over-the-counter medication clotrimazole cream and has been applying it with some relief, however continues to have pain.  He does not have insurance as it  and he is currently not working so he has been off his antihypertensive medication.  Currently he is not having any chest pain shortness of breath headache diplopia or other changes.  He states that he intermediately notices there is white cottage like discharge from the penile region.  Patient speaks [place] only and  [id number] was used for this encounter    Medications: Lisinopril 40mg QD, amlodipine 10 mg QD, hydrochlorothiazide 25 mg, carvedilol 25 mg BID Patient who has past medical history of hypertension and diabetes ran out of both medications as he has no health insurance presents to emergency department for 1 week of penile pain associated with dysuria and localized swelling.  He states that he has 1 sexual partner his wife.  He states that he bought an over-the-counter medication clotrimazole cream and has been applying it with some relief, however continues to have pain.  He does not have insurance as it  and he is currently not working so he has been off his antihypertensive medication.  Currently he is not having any chest pain shortness of breath headache diplopia or other changes.  He states that he intermediately notices there is white cottage like discharge from the penile region.  Patient speaks Japanese only and  [id number] was used for this encounter    Home Medications: Lisinopril 40mg QD, amlodipine 10 mg QD, hydrochlorothiazide 25 mg, carvedilol 25 mg BID

## 2024-01-24 NOTE — ED ADULT NURSE NOTE - OBJECTIVE STATEMENT
48y Male AOx4 with PMH HTN presents to the ED c/o penile pain, dysuria and swelling x1 week. States he used OTC clotrimazole cream with relief. Also endorses intermittent white discharge.  Does not have insurance and does not work - unable to take antihypertensive medication. Denies chest pain, palpitations, HA or changes in vision. Denies N/V, fever/chills, SOB, chest pain. Placed on cardiac monitor. Spontaneous/unlabored respirations, speaking in full sentences. Side rails up, bed in lowest position, safety maintained.

## 2024-01-24 NOTE — H&P ADULT - NSICDXFAMILYHX_GEN_ALL_CORE_FT
FAMILY HISTORY:  Mother  Still living? Unknown  Family history of stroke, Age at diagnosis: Age Unknown    Sibling  Still living? Unknown  Family history of stroke, Age at diagnosis: Age Unknown     FAMILY HISTORY:  FHx: type 2 diabetes mellitus    Mother  Still living? Unknown  Family history of stroke, Age at diagnosis: Age Unknown    Sibling  Still living? Unknown  Family history of stroke, Age at diagnosis: Age Unknown

## 2024-01-25 DIAGNOSIS — E78.5 HYPERLIPIDEMIA, UNSPECIFIED: ICD-10-CM

## 2024-01-25 DIAGNOSIS — I16.1 HYPERTENSIVE EMERGENCY: ICD-10-CM

## 2024-01-25 DIAGNOSIS — Z29.9 ENCOUNTER FOR PROPHYLACTIC MEASURES, UNSPECIFIED: ICD-10-CM

## 2024-01-25 DIAGNOSIS — E11.65 TYPE 2 DIABETES MELLITUS WITH HYPERGLYCEMIA: ICD-10-CM

## 2024-01-25 DIAGNOSIS — B37.49 OTHER UROGENITAL CANDIDIASIS: ICD-10-CM

## 2024-01-25 DIAGNOSIS — I16.0 HYPERTENSIVE URGENCY: ICD-10-CM

## 2024-01-25 LAB
A1C WITH ESTIMATED AVERAGE GLUCOSE RESULT: 10.1 % — HIGH (ref 4–5.6)
A1C WITH ESTIMATED AVERAGE GLUCOSE RESULT: 10.3 % — HIGH (ref 4–5.6)
ALBUMIN SERPL ELPH-MCNC: 4 G/DL — SIGNIFICANT CHANGE UP (ref 3.3–5)
ALBUMIN SERPL ELPH-MCNC: 4 G/DL — SIGNIFICANT CHANGE UP (ref 3.3–5)
ALP SERPL-CCNC: 136 U/L — HIGH (ref 40–120)
ALP SERPL-CCNC: 139 U/L — HIGH (ref 40–120)
ALT FLD-CCNC: 155 U/L — HIGH (ref 10–45)
ALT FLD-CCNC: 156 U/L — HIGH (ref 10–45)
ANION GAP SERPL CALC-SCNC: 17 MMOL/L — SIGNIFICANT CHANGE UP (ref 5–17)
ANION GAP SERPL CALC-SCNC: 17 MMOL/L — SIGNIFICANT CHANGE UP (ref 5–17)
AST SERPL-CCNC: 163 U/L — HIGH (ref 10–40)
AST SERPL-CCNC: 172 U/L — HIGH (ref 10–40)
BASOPHILS # BLD AUTO: 0.07 K/UL — SIGNIFICANT CHANGE UP (ref 0–0.2)
BASOPHILS NFR BLD AUTO: 0.9 % — SIGNIFICANT CHANGE UP (ref 0–2)
BILIRUB SERPL-MCNC: 0.7 MG/DL — SIGNIFICANT CHANGE UP (ref 0.2–1.2)
BILIRUB SERPL-MCNC: 0.7 MG/DL — SIGNIFICANT CHANGE UP (ref 0.2–1.2)
BLD GP AB SCN SERPL QL: NEGATIVE — SIGNIFICANT CHANGE UP
BUN SERPL-MCNC: 14 MG/DL — SIGNIFICANT CHANGE UP (ref 7–23)
BUN SERPL-MCNC: 15 MG/DL — SIGNIFICANT CHANGE UP (ref 7–23)
C TRACH RRNA SPEC QL NAA+PROBE: SIGNIFICANT CHANGE UP
CALCIUM SERPL-MCNC: 9.5 MG/DL — SIGNIFICANT CHANGE UP (ref 8.4–10.5)
CALCIUM SERPL-MCNC: 9.6 MG/DL — SIGNIFICANT CHANGE UP (ref 8.4–10.5)
CHLORIDE SERPL-SCNC: 92 MMOL/L — LOW (ref 96–108)
CHLORIDE SERPL-SCNC: 93 MMOL/L — LOW (ref 96–108)
CHOLEST SERPL-MCNC: 315 MG/DL — HIGH
CO2 SERPL-SCNC: 23 MMOL/L — SIGNIFICANT CHANGE UP (ref 22–31)
CO2 SERPL-SCNC: 24 MMOL/L — SIGNIFICANT CHANGE UP (ref 22–31)
CREAT SERPL-MCNC: 0.58 MG/DL — SIGNIFICANT CHANGE UP (ref 0.5–1.3)
CREAT SERPL-MCNC: 0.59 MG/DL — SIGNIFICANT CHANGE UP (ref 0.5–1.3)
CULTURE RESULTS: SIGNIFICANT CHANGE UP
EGFR: 121 ML/MIN/1.73M2 — SIGNIFICANT CHANGE UP
EGFR: 122 ML/MIN/1.73M2 — SIGNIFICANT CHANGE UP
EOSINOPHIL # BLD AUTO: 0.15 K/UL — SIGNIFICANT CHANGE UP (ref 0–0.5)
EOSINOPHIL NFR BLD AUTO: 1.9 % — SIGNIFICANT CHANGE UP (ref 0–6)
ESTIMATED AVERAGE GLUCOSE: 243 MG/DL — HIGH (ref 68–114)
ESTIMATED AVERAGE GLUCOSE: 249 MG/DL — HIGH (ref 68–114)
GLUCOSE BLDC GLUCOMTR-MCNC: 227 MG/DL — HIGH (ref 70–99)
GLUCOSE BLDC GLUCOMTR-MCNC: 256 MG/DL — HIGH (ref 70–99)
GLUCOSE BLDC GLUCOMTR-MCNC: 262 MG/DL — HIGH (ref 70–99)
GLUCOSE BLDC GLUCOMTR-MCNC: 262 MG/DL — HIGH (ref 70–99)
GLUCOSE BLDC GLUCOMTR-MCNC: 272 MG/DL — HIGH (ref 70–99)
GLUCOSE BLDC GLUCOMTR-MCNC: 289 MG/DL — HIGH (ref 70–99)
GLUCOSE BLDC GLUCOMTR-MCNC: 293 MG/DL — HIGH (ref 70–99)
GLUCOSE BLDC GLUCOMTR-MCNC: 467 MG/DL — CRITICAL HIGH (ref 70–99)
GLUCOSE BLDC GLUCOMTR-MCNC: 528 MG/DL — CRITICAL HIGH (ref 70–99)
GLUCOSE SERPL-MCNC: 266 MG/DL — HIGH (ref 70–99)
GLUCOSE SERPL-MCNC: 277 MG/DL — HIGH (ref 70–99)
HCT VFR BLD CALC: 42.9 % — SIGNIFICANT CHANGE UP (ref 39–50)
HDLC SERPL-MCNC: 35 MG/DL — LOW
HGB BLD-MCNC: 14.6 G/DL — SIGNIFICANT CHANGE UP (ref 13–17)
IMM GRANULOCYTES NFR BLD AUTO: 0.3 % — SIGNIFICANT CHANGE UP (ref 0–0.9)
LIPID PNL WITH DIRECT LDL SERPL: 200 MG/DL — HIGH
LYMPHOCYTES # BLD AUTO: 2.6 K/UL — SIGNIFICANT CHANGE UP (ref 1–3.3)
LYMPHOCYTES # BLD AUTO: 32.9 % — SIGNIFICANT CHANGE UP (ref 13–44)
MAGNESIUM SERPL-MCNC: 2 MG/DL — SIGNIFICANT CHANGE UP (ref 1.6–2.6)
MAGNESIUM SERPL-MCNC: 2.2 MG/DL — SIGNIFICANT CHANGE UP (ref 1.6–2.6)
MCHC RBC-ENTMCNC: 31.1 PG — SIGNIFICANT CHANGE UP (ref 27–34)
MCHC RBC-ENTMCNC: 34 GM/DL — SIGNIFICANT CHANGE UP (ref 32–36)
MCV RBC AUTO: 91.3 FL — SIGNIFICANT CHANGE UP (ref 80–100)
MONOCYTES # BLD AUTO: 0.6 K/UL — SIGNIFICANT CHANGE UP (ref 0–0.9)
MONOCYTES NFR BLD AUTO: 7.6 % — SIGNIFICANT CHANGE UP (ref 2–14)
N GONORRHOEA RRNA SPEC QL NAA+PROBE: SIGNIFICANT CHANGE UP
NEUTROPHILS # BLD AUTO: 4.46 K/UL — SIGNIFICANT CHANGE UP (ref 1.8–7.4)
NEUTROPHILS NFR BLD AUTO: 56.4 % — SIGNIFICANT CHANGE UP (ref 43–77)
NON HDL CHOLESTEROL: 281 MG/DL — HIGH
NRBC # BLD: 0 /100 WBCS — SIGNIFICANT CHANGE UP (ref 0–0)
PHOSPHATE SERPL-MCNC: 3.2 MG/DL — SIGNIFICANT CHANGE UP (ref 2.5–4.5)
PHOSPHATE SERPL-MCNC: 3.5 MG/DL — SIGNIFICANT CHANGE UP (ref 2.5–4.5)
PLATELET # BLD AUTO: 264 K/UL — SIGNIFICANT CHANGE UP (ref 150–400)
POTASSIUM SERPL-MCNC: 4.2 MMOL/L — SIGNIFICANT CHANGE UP (ref 3.5–5.3)
POTASSIUM SERPL-MCNC: 4.9 MMOL/L — SIGNIFICANT CHANGE UP (ref 3.5–5.3)
POTASSIUM SERPL-SCNC: 4.2 MMOL/L — SIGNIFICANT CHANGE UP (ref 3.5–5.3)
POTASSIUM SERPL-SCNC: 4.9 MMOL/L — SIGNIFICANT CHANGE UP (ref 3.5–5.3)
PROT SERPL-MCNC: 8.1 G/DL — SIGNIFICANT CHANGE UP (ref 6–8.3)
PROT SERPL-MCNC: 8.2 G/DL — SIGNIFICANT CHANGE UP (ref 6–8.3)
RBC # BLD: 4.7 M/UL — SIGNIFICANT CHANGE UP (ref 4.2–5.8)
RBC # FLD: 11.9 % — SIGNIFICANT CHANGE UP (ref 10.3–14.5)
RH IG SCN BLD-IMP: POSITIVE — SIGNIFICANT CHANGE UP
SODIUM SERPL-SCNC: 133 MMOL/L — LOW (ref 135–145)
SODIUM SERPL-SCNC: 133 MMOL/L — LOW (ref 135–145)
SPECIMEN SOURCE: SIGNIFICANT CHANGE UP
SPECIMEN SOURCE: SIGNIFICANT CHANGE UP
T PALLIDUM AB TITR SER: NEGATIVE — SIGNIFICANT CHANGE UP
TRIGL SERPL-MCNC: 392 MG/DL — HIGH
TSH SERPL-MCNC: 1.59 UIU/ML — SIGNIFICANT CHANGE UP (ref 0.27–4.2)
WBC # BLD: 7.9 K/UL — SIGNIFICANT CHANGE UP (ref 3.8–10.5)
WBC # FLD AUTO: 7.9 K/UL — SIGNIFICANT CHANGE UP (ref 3.8–10.5)

## 2024-01-25 PROCEDURE — 99223 1ST HOSP IP/OBS HIGH 75: CPT

## 2024-01-25 PROCEDURE — 93010 ELECTROCARDIOGRAM REPORT: CPT

## 2024-01-25 PROCEDURE — 93306 TTE W/DOPPLER COMPLETE: CPT | Mod: 26

## 2024-01-25 PROCEDURE — 76705 ECHO EXAM OF ABDOMEN: CPT | Mod: 26

## 2024-01-25 PROCEDURE — 99233 SBSQ HOSP IP/OBS HIGH 50: CPT

## 2024-01-25 PROCEDURE — 99291 CRITICAL CARE FIRST HOUR: CPT

## 2024-01-25 RX ORDER — INSULIN LISPRO 100/ML
8 VIAL (ML) SUBCUTANEOUS
Refills: 0 | Status: DISCONTINUED | OUTPATIENT
Start: 2024-01-25 | End: 2024-01-26

## 2024-01-25 RX ORDER — FLUCONAZOLE 150 MG/1
150 TABLET ORAL ONCE
Refills: 0 | Status: COMPLETED | OUTPATIENT
Start: 2024-01-25 | End: 2024-01-25

## 2024-01-25 RX ORDER — INSULIN LISPRO 100/ML
8 VIAL (ML) SUBCUTANEOUS ONCE
Refills: 0 | Status: COMPLETED | OUTPATIENT
Start: 2024-01-25 | End: 2024-01-25

## 2024-01-25 RX ORDER — LISINOPRIL 2.5 MG/1
40 TABLET ORAL DAILY
Refills: 0 | Status: DISCONTINUED | OUTPATIENT
Start: 2024-01-26 | End: 2024-01-27

## 2024-01-25 RX ORDER — INSULIN GLARGINE 100 [IU]/ML
24 INJECTION, SOLUTION SUBCUTANEOUS AT BEDTIME
Refills: 0 | Status: DISCONTINUED | OUTPATIENT
Start: 2024-01-25 | End: 2024-01-27

## 2024-01-25 RX ORDER — ATORVASTATIN CALCIUM 80 MG/1
40 TABLET, FILM COATED ORAL AT BEDTIME
Refills: 0 | Status: DISCONTINUED | OUTPATIENT
Start: 2024-01-25 | End: 2024-01-27

## 2024-01-25 RX ORDER — HEPARIN SODIUM 5000 [USP'U]/ML
5000 INJECTION INTRAVENOUS; SUBCUTANEOUS EVERY 8 HOURS
Refills: 0 | Status: DISCONTINUED | OUTPATIENT
Start: 2024-01-25 | End: 2024-01-27

## 2024-01-25 RX ORDER — LANOLIN ALCOHOL/MO/W.PET/CERES
3 CREAM (GRAM) TOPICAL AT BEDTIME
Refills: 0 | Status: DISCONTINUED | OUTPATIENT
Start: 2024-01-25 | End: 2024-01-27

## 2024-01-25 RX ORDER — ONDANSETRON 8 MG/1
4 TABLET, FILM COATED ORAL EVERY 8 HOURS
Refills: 0 | Status: DISCONTINUED | OUTPATIENT
Start: 2024-01-25 | End: 2024-01-27

## 2024-01-25 RX ORDER — INSULIN LISPRO 100/ML
VIAL (ML) SUBCUTANEOUS
Refills: 0 | Status: DISCONTINUED | OUTPATIENT
Start: 2024-01-25 | End: 2024-01-27

## 2024-01-25 RX ORDER — ATORVASTATIN CALCIUM 80 MG/1
80 TABLET, FILM COATED ORAL AT BEDTIME
Refills: 0 | Status: DISCONTINUED | OUTPATIENT
Start: 2024-01-25 | End: 2024-01-25

## 2024-01-25 RX ADMIN — FLUCONAZOLE 150 MILLIGRAM(S): 150 TABLET ORAL at 18:31

## 2024-01-25 RX ADMIN — Medication 650 MILLIGRAM(S): at 22:33

## 2024-01-25 RX ADMIN — Medication 6: at 02:26

## 2024-01-25 RX ADMIN — ATORVASTATIN CALCIUM 40 MILLIGRAM(S): 80 TABLET, FILM COATED ORAL at 22:31

## 2024-01-25 RX ADMIN — Medication 1 APPLICATION(S): at 05:42

## 2024-01-25 RX ADMIN — Medication 3: at 19:01

## 2024-01-25 RX ADMIN — Medication 6: at 13:49

## 2024-01-25 RX ADMIN — Medication 8 UNIT(S): at 14:27

## 2024-01-25 RX ADMIN — HEPARIN SODIUM 5000 UNIT(S): 5000 INJECTION INTRAVENOUS; SUBCUTANEOUS at 22:31

## 2024-01-25 RX ADMIN — INSULIN GLARGINE 24 UNIT(S): 100 INJECTION, SOLUTION SUBCUTANEOUS at 22:30

## 2024-01-25 RX ADMIN — Medication 2: at 22:30

## 2024-01-25 RX ADMIN — HEPARIN SODIUM 5000 UNIT(S): 5000 INJECTION INTRAVENOUS; SUBCUTANEOUS at 14:25

## 2024-01-25 RX ADMIN — Medication 1 APPLICATION(S): at 18:30

## 2024-01-25 RX ADMIN — Medication 6 UNIT(S): at 08:42

## 2024-01-25 RX ADMIN — Medication 4: at 05:42

## 2024-01-25 RX ADMIN — Medication 3 MILLIGRAM(S): at 22:31

## 2024-01-25 NOTE — CONSULT NOTE ADULT - SUBJECTIVE AND OBJECTIVE BOX
HPI:  47 yo M w/ PMHx of HTN presents for 1 week of penile pain associated with dysuria and localized swelling. Pt stated that he ran out of his home medications as he does not have health insurance presently. He states that he has 1 sexual partner, his wife.  He stated that he bought an over-the-counter medication clotrimazole cream and has been applying it with some relief, however continues to have pain.  He does not have insurance as it  and he is currently not working so he has been off his antihypertensive medication.  Pt denied chest pain, SOB, headache, diplopia or other changes.  He states that he intermediately notices there is white discharge from the penile region. Pt has not taken medications for over 1 year, has not seen doctors for 1-2 years.     In ED, pt was found to have BP of 247/150 and hyperglycemic to 393. He received HCTZ 25 x1, amlodipine 10 x1, labetalol 10 x1, 8u admelog, nicardipine gtt.  	  Home Medications: Lisinopril 40mg QD, amlodipine 10 mg QD, hydrochlorothiazide 25 mg, carvedilol 25 mg BID (2024 17:37)      PAST MEDICAL & SURGICAL HISTORY:  Hypertension      Alcohol dependence      No significant past surgical history          FAMILY HISTORY:  Family history of stroke (Mother, Sibling)    FHx: type 2 diabetes mellitus        Social History: +former tobacco use. Quit 7 years ago 5 cig/day. +hx of alcohol abuse    Outpatient Medications:  · 	amLODIPine 10 mg oral tablet: 1 tab(s) orally once a day  · 	carvedilol 25 mg oral tablet: 1 tab(s) orally 2 times a day  · 	atorvastatin 40 mg oral tablet: 1 tab(s) orally once a day     MEDICATIONS  (STANDING):  atorvastatin 40 milliGRAM(s) Oral at bedtime  clotrimazole 1% Cream 1 Application(s) Topical every 12 hours  dextrose 50% Injectable 12.5 Gram(s) IV Push once  dextrose 50% Injectable 25 Gram(s) IV Push once  dextrose 50% Injectable 25 Gram(s) IV Push once  dextrose Oral Gel 15 Gram(s) Oral once  fluconAZOLE   Tablet 150 milliGRAM(s) Oral once  glucagon  Injectable 1 milliGRAM(s) IntraMuscular once  heparin   Injectable 5000 Unit(s) SubCutaneous every 8 hours  influenza   Vaccine 0.5 milliLiter(s) IntraMuscular once  insulin glargine Injectable (LANTUS) 24 Unit(s) SubCutaneous at bedtime  insulin lispro (ADMELOG) corrective regimen sliding scale   SubCutaneous three times a day before meals  insulin lispro (ADMELOG) corrective regimen sliding scale   SubCutaneous at bedtime  insulin lispro Injectable (ADMELOG) 8 Unit(s) SubCutaneous three times a day before meals  insulin lispro Injectable (ADMELOG). 8 Unit(s) SubCutaneous once    MEDICATIONS  (PRN):  acetaminophen     Tablet .. 650 milliGRAM(s) Oral every 6 hours PRN Temp greater or equal to 38C (100.4F), Moderate Pain (4 - 6)  aluminum hydroxide/magnesium hydroxide/simethicone Suspension 30 milliLiter(s) Oral every 4 hours PRN Dyspepsia  melatonin 3 milliGRAM(s) Oral at bedtime PRN Insomnia  ondansetron Injectable 4 milliGRAM(s) IV Push every 8 hours PRN Nausea and/or Vomiting      Allergies    No Known Allergies    Intolerances      Review of Systems:  Constitutional: No fever, No change in weight  Eyes: No blurry vision  Neuro: No headache, No paresthesias  HEENT: No throat pain  Cardiovascular: No chest pain  Respiratory: No SOB  GI: No nausea or vomiting  : No polyuria  Skin: no rash  Psych: no depression  Endocrine: No polydipsia, No heat or cold intolerance, rest as noted in HPI  Hem/lymph: no swelling    All other review of systems negative      PHYSICAL EXAM:  VITALS: T(C): 36.8 (24 @ 11:20)  T(F): 98.2 (24 @ 11:20), Max: 98.4 (24 @ 17:56)  HR: 84 (24 @ 11:20) (73 - 97)  BP: 166/105 (24 @ 11:20) (125/80 - 221/150)  RR:  (10 - 25)  SpO2:  (92% - 98%)  Wt(kg): --  GENERAL: NAD at this time  EYES: No proptosis, EOMI  HEENT:  Atraumatic, Normocephalic,   THYROID: Normal size, no palpable nodules  RESPIRATORY: Clear to auscultation bilaterally, full excursion, non-labored  CARDIOVASCULAR: Regular rhythm; No murmurs; no peripheral edema  GI: Soft, nontender, non distended, normal bowel sounds  SKIN: Dry, intact, No rashes or lesions  MUSCULOSKELETAL: normal strength  NEURO: follows commands, no tremor, normal reflexes  PSYCH: Alert and oriented x 3, normal affect, normal mood  CUSHING'S SIGNS: no striae      POCT Blood Glucose.: 467 mg/dL (24 @ 13:05)  POCT Blood Glucose.: 528 mg/dL (24 @ 13:04)  POCT Blood Glucose.: 256 mg/dL (24 @ 08:35)  POCT Blood Glucose.: 227 mg/dL (24 @ 05:41)  POCT Blood Glucose.: 262 mg/dL (24 @ 02:24)  POCT Blood Glucose.: 329 mg/dL (24 @ 21:41)  POCT Blood Glucose.: 329 mg/dL (24 @ 19:37)  POCT Blood Glucose.: 293 mg/dL (24 @ 18:10)  POCT Blood Glucose.: 313 mg/dL (24 @ 15:43)  POCT Blood Glucose.: 393 mg/dL (24 @ 14:57)                              14.6   7.90  )-----------( 264      ( 2024 01:34 )             42.9           133<L>  |  92<L>  |  14  ----------------------------<  266<H>  4.9   |  24  |  0.59    eGFR: 121    Ca    9.5        Mg     2.2       Phos  3.5         TPro  8.2  /  Alb  4.0  /  TBili  0.7  /  DBili  x   /  AST  172<H>  /  ALT  156<H>  /  AlkPhos  136<H>      Thyroid Function Tests:   @ 00:39 TSH 1.59 FreeT4 -- T3 -- Anti TPO -- Anti Thyroglobulin Ab -- TSI --           Chol 315<H> Direct LDL -- LDL calculated 200<H> HDL 35<L> Trig 392<H>  Radiology:              HPI:  47 y/o M w/ no prior hx of diabetes. Brother with hx of Type 2 DM. Has noticed some polyuria and polydipsia recently. No nausea or vomiting. No blurry vision. No weight loss. No paresthesias. No recent steroids. Eats a lot of rice, tortillas, and bread. Also drinks juice and regular soda. No reported hypoglycemia or symptoms of hypoglycemia.   Also hx of HTN presents for 1 week of penile pain associated with dysuria and localized swelling. Pt stated that he ran out of his home medications as he does not have health insurance presently. He states that he has 1 sexual partner, his wife.  He stated that he bought an over-the-counter medication clotrimazole cream and has been applying it with some relief, however continues to have pain.  He does not have insurance as it  and he is currently not working so he has been off his antihypertensive medication.  Pt denied chest pain, SOB, headache, diplopia or other changes.  He states that he intermediately notices there is white discharge from the penile region. Pt has not taken medications for over 1 year, has not seen doctors for 1-2 years.     In ED, pt was found to have BP of 247/150 and hyperglycemic to 393. He received HCTZ 25 x1, amlodipine 10 x1, labetalol 10 x1, 8u admelog, nicardipine gtt.  	  Home Medications: Lisinopril 40mg QD, amlodipine 10 mg QD, hydrochlorothiazide 25 mg, carvedilol 25 mg BID (2024 17:37)      PAST MEDICAL & SURGICAL HISTORY:  Hypertension      Alcohol dependence      No significant past surgical history          FAMILY HISTORY:  Family history of stroke (Mother, Sibling)    FHx: type 2 diabetes mellitus- Brother        Social History: +former tobacco use. Quit 7 years ago 5 cig/day. +hx of alcohol abuse    Outpatient Medications:  · 	amLODIPine 10 mg oral tablet: 1 tab(s) orally once a day  · 	carvedilol 25 mg oral tablet: 1 tab(s) orally 2 times a day  · 	atorvastatin 40 mg oral tablet: 1 tab(s) orally once a day     MEDICATIONS  (STANDING):  atorvastatin 40 milliGRAM(s) Oral at bedtime  clotrimazole 1% Cream 1 Application(s) Topical every 12 hours  dextrose 50% Injectable 12.5 Gram(s) IV Push once  dextrose 50% Injectable 25 Gram(s) IV Push once  dextrose 50% Injectable 25 Gram(s) IV Push once  dextrose Oral Gel 15 Gram(s) Oral once  fluconAZOLE   Tablet 150 milliGRAM(s) Oral once  glucagon  Injectable 1 milliGRAM(s) IntraMuscular once  heparin   Injectable 5000 Unit(s) SubCutaneous every 8 hours  influenza   Vaccine 0.5 milliLiter(s) IntraMuscular once  insulin glargine Injectable (LANTUS) 24 Unit(s) SubCutaneous at bedtime  insulin lispro (ADMELOG) corrective regimen sliding scale   SubCutaneous three times a day before meals  insulin lispro (ADMELOG) corrective regimen sliding scale   SubCutaneous at bedtime  insulin lispro Injectable (ADMELOG) 8 Unit(s) SubCutaneous three times a day before meals  insulin lispro Injectable (ADMELOG). 8 Unit(s) SubCutaneous once    MEDICATIONS  (PRN):  acetaminophen     Tablet .. 650 milliGRAM(s) Oral every 6 hours PRN Temp greater or equal to 38C (100.4F), Moderate Pain (4 - 6)  aluminum hydroxide/magnesium hydroxide/simethicone Suspension 30 milliLiter(s) Oral every 4 hours PRN Dyspepsia  melatonin 3 milliGRAM(s) Oral at bedtime PRN Insomnia  ondansetron Injectable 4 milliGRAM(s) IV Push every 8 hours PRN Nausea and/or Vomiting      Allergies    No Known Allergies    Intolerances      Review of Systems:  Constitutional: No fever, No change in weight  Eyes: No blurry vision  Neuro: No headache, No paresthesias  HEENT: No throat pain  Cardiovascular: No chest pain  Respiratory: No SOB  GI: No nausea or vomiting  : + polyuria  Skin: +penile and  rash  Psych: no depression  Endocrine: + polydipsia, No heat or cold intolerance, rest as noted in HPI  Hem/lymph: no swelling    All other review of systems negative      PHYSICAL EXAM:  VITALS: T(C): 36.8 (24 @ 11:20)  T(F): 98.2 (24 @ 11:20), Max: 98.4 (24 @ 17:56)  HR: 84 (24 @ 11:20) (73 - 97)  BP: 166/105 (24 @ 11:20) (125/80 - 221/150)  RR:  (10 - 25)  SpO2:  (92% - 98%)  Wt(kg): --  GENERAL: NAD at this time  EYES: No proptosis, EOMI  HEENT:  Atraumatic, Normocephalic,   THYROID: Normal size, no palpable nodules  RESPIRATORY: Clear to auscultation bilaterally, full excursion, non-labored  CARDIOVASCULAR: Regular rhythm; No murmurs; no peripheral edema  GI: Soft, nontender, non distended, normal bowel sounds  SKIN: Dry, intact, +erythema of penis and testes  MUSCULOSKELETAL: normal strength  NEURO: follows commands  PSYCH: Alert and oriented x 3, normal affect, normal mood  CUSHING'S SIGNS: no striae      POCT Blood Glucose.: 467 mg/dL (24 @ 13:05)  POCT Blood Glucose.: 528 mg/dL (24 @ 13:04)  POCT Blood Glucose.: 256 mg/dL (24 @ 08:35)  POCT Blood Glucose.: 227 mg/dL (24 @ 05:41)  POCT Blood Glucose.: 262 mg/dL (24 @ 02:24)  POCT Blood Glucose.: 329 mg/dL (24 @ 21:41)  POCT Blood Glucose.: 329 mg/dL (24 @ 19:37)  POCT Blood Glucose.: 293 mg/dL (24 @ 18:10)  POCT Blood Glucose.: 313 mg/dL (24 @ 15:43)  POCT Blood Glucose.: 393 mg/dL (24 @ 14:57)                              14.6   7.90  )-----------( 264      ( 2024 01:34 )             42.9           133<L>  |  92<L>  |  14  ----------------------------<  266<H>  4.9   |  24  |  0.59    eGFR: 121    Ca    9.5        Mg     2.2       Phos  3.5         TPro  8.2  /  Alb  4.0  /  TBili  0.7  /  DBili  x   /  AST  172<H>  /  ALT  156<H>  /  AlkPhos  136<H>      Thyroid Function Tests:   @ 00:39 TSH 1.59 FreeT4 -- T3 -- Anti TPO -- Anti Thyroglobulin Ab -- TSI --           Chol 315<H> Direct LDL -- LDL calculated 200<H> HDL 35<L> Trig 392<H>  Radiology:

## 2024-01-25 NOTE — CONSULT NOTE ADULT - PROBLEM SELECTOR RECOMMENDATION 9
Diabetes Education and Nutrition Eval  Increase Lantus to 24 units qhs  Increase Admelog to 8units qac  Low correction scale qac + bedtime  Goal glucose 100-180  Outpt. endo follow-up  Outpt. optho, podiatry, nephrology  Plan to d/c on basal + orals depending on insulin requirements and c-peptide. Diabetes Education and Nutrition Eval  Increase Lantus to 24 units qhs  Increase Admelog to 8 units qac  Low correction scale qac + bedtime  Goal glucose 100-180  Outpt. endo follow-up  Outpt. optho, podiatry, nephrology  Plan to d/c on basal + orals depending on insulin requirements and c-peptide.

## 2024-01-25 NOTE — CONSULT NOTE ADULT - PROBLEM SELECTOR RECOMMENDATION 2
Goal BP < 130/80 c/w in-patient regimen for now  Check for secondary causes from endo standpoint such as aldosterone, PRA, plasma metanephrines. Can screen for cushing's as outpatient as suspicion is low.

## 2024-01-25 NOTE — PROGRESS NOTE ADULT - ATTENDING COMMENTS
Hypertensive urgency - transition to PO regimen  UTI - follow-up cultures and treat accordingly  Concern for DKA - monitor labs, consider insulin gtt and IV fluids .

## 2024-01-25 NOTE — CHART NOTE - NSCHARTNOTEFT_GEN_A_CORE
CICU Transfer Note    Transfer from: CICU    Transfer to: (  ) Medicine    ( X ) Telemetry     (   ) RCU        (    ) Palliative         (   ) Stroke Unit       (  ) MICU       Accepting Physician: ______________    Signout given to: ______________    CICU COURSE:   48M HTN (noncompliant w/ meds at home given lack of insurance), DM2 p/w penile pain/dysuria found to have SBP >240, adm to CICU for Cardene gtt. Cardene was not on while in CICU, as SBP was 170 upon admission to unit. Additionally, glucose >500 on admission. Given fast acting insulin and started on lantus. FS 200s currently with  (without restarting PO meds yet).       FOR FOLLOW UP:  [] reintroduce BP meds as tolerated  [] follow up penile cultures, consider urology consult  [] consider endo consult, A1c 10.8 with Glucose >500 on admission    Hillary Vázquez PA-C CICU Transfer Note    Transfer from: CICU    Transfer to: (  ) Medicine    ( X ) Telemetry     (   ) RCU        (    ) Palliative         (   ) Stroke Unit       (  ) MICU       Accepting Physician: ______________    Signout given to: ______________    CICU COURSE:   48M HTN (w/ barriers to acquiring meds as currently unemployed and without insurance) p/w penile pain/dysuria found to have SBP >240, adm to CICU for hypertensive crisis, on Cardene gtt. Cardene was not on while in CICU, as SBP was 170 upon admission to unit. Additionally, glucose >500 on admission. Given fast acting insulin and started on 22u lantus. FS 200s currently with  (without restarting PO meds yet). Pt also found to have Type 2 DM, A1c of 10.3.      FOR FOLLOW UP:  [] reintroduce BP meds as tolerated (has not taken these meds for >1 year)  [] follow up urine cx, consider obtaining cx of discharge or urology consult  [] endo emailed, A1c 10.8 with Glucose >500 on admission  [] f/u GC chlamydia  [] SBIRT consult placed, pt w/ hx of binge drinking and difficulty quitting smoking   [] SW consult placed, presently uninsured and unemployed CICU Transfer Note    Transfer from: CICU    Transfer to: (  ) Medicine    ( X ) Telemetry     (   ) RCU        (    ) Palliative         (   ) Stroke Unit       (  ) MICU       Accepting Physician: Dr. Feliciano    Signout given to: ______________    CICU COURSE:   48M HTN (w/ barriers to acquiring meds as currently unemployed and without insurance) p/w penile pain/dysuria found to have SBP >240, adm to CICU for hypertensive crisis, on Cardene gtt. Cardene was not on while in CICU, as SBP was 170 upon admission to unit. Additionally, glucose >500 on admission. Given fast acting insulin and started on 22u lantus. FS 200s currently with  (without restarting PO meds yet). Pt also found to have Type 2 DM, A1c of 10.3. Atorvastatin 80 mg started as LDL >200. Pt was started on clotrimazole for penile and testicular erythema. Syphilis, HIV, UA negative.      FOR FOLLOW UP:  [] reintroduce BP meds as tolerated (has not taken these meds for >1 year)  [] follow up urine cx, consider obtaining cx of discharge or urology consult  [] consider abx if w/ fever or WBC trends up  [] endo emailed, A1c 10.8 with Glucose >500 on admission  [] f/u GC chlamydia  [] SBIRT consult placed, pt w/ hx of binge drinking and difficulty quitting smoking   [] SW consult placed, presently uninsured and unemployed- will need emergency medicaid CICU Transfer Note    Transfer from: CICU    Transfer to: (  ) Medicine    ( X ) Telemetry     (   ) RCU        (    ) Palliative         (   ) Stroke Unit       (  ) MICU       Accepting Physician: Dr. Feliciano    Signout given to: ______________    CICU COURSE:   48M HTN (w/ barriers to acquiring meds as currently unemployed and without insurance) p/w penile pain/dysuria found to have SBP >240, adm to CICU for hypertensive crisis, on Cardene gtt. Cardene was not on while in CICU, as SBP was 170 upon admission to unit. Additionally, glucose >500 on admission. Given fast acting insulin and started on 22u lantus. FS 200s currently with  (without restarting PO meds yet). Pt also found to have Type 2 DM, A1c of 10.3. Atorvastatin 80 mg started as LDL >200. Pt was started on clotrimazole for penile and testicular erythema. Syphilis, HIV, UA negative.      FOR FOLLOW UP:  [] reintroduce BP meds as tolerated (has not taken these meds for >1 year)  [] follow up urine cx, consider obtaining cx of discharge or derm or urology consult  [] consider abx if w/ fever or WBC trends up  [] endo emailed, A1c 10.8 with Glucose >500 on admission  [] f/u GC chlamydia  [] SBIRT consult placed, pt w/ hx of binge drinking and difficulty quitting smoking   [] SW consult placed, presently uninsured and unemployed- will need emergency medicaid CICU Transfer Note    Transfer from: CICU    Transfer to: (  ) Medicine    ( X ) Telemetry     (   ) RCU        (    ) Palliative         (   ) Stroke Unit       (  ) MICU       Accepting Physician: Dr. Feliciano    Signout given to: ______________    CICU COURSE:   48M HTN (w/ barriers to acquiring meds as currently unemployed and without insurance) p/w penile pain/dysuria found to have SBP >240, adm to CICU for hypertensive crisis, on Cardene gtt. Cardene was not on while in CICU, as SBP was 170 upon admission to unit. Additionally, glucose >500 on admission. Given fast acting insulin and started on 22u lantus. FS 200s currently with  (without restarting PO meds yet). Pt also found to have Type 2 DM, A1c of 10.3. Atorvastatin 80 mg started as LDL >200. Pt was started on clotrimazole for penile and testicular erythema. Syphilis, HIV, UA negative.      FOR FOLLOW UP:  [] reintroduce BP meds as tolerated (has not taken these meds for >1 year)  [] follow up urine cx, consider obtaining cx of discharge or derm or urology consult  [] consider abx if w/ fever or WBC trends up  [] endo emailed, A1c 10.8 with Glucose >500 on admission  [] f/u GC chlamydia  [] f/u RUQ US for transaminitis  [] SBIRT consult placed, pt w/ hx of binge drinking and difficulty quitting smoking   [] SW consult placed, presently uninsured and unemployed- will need emergency medicaid CICU Transfer Note    Transfer from: CICU    Transfer to: (  ) Medicine    ( X ) Telemetry     (   ) RCU        (    ) Palliative         (   ) Stroke Unit       (  ) MICU       Accepting Physician: Dr. Feliciano  Room: 345W [id number]    Signout given to: ______________    CICU COURSE:   48M HTN (w/ barriers to acquiring meds as currently unemployed and without insurance) p/w penile pain/dysuria found to have SBP >240, adm to CICU for hypertensive crisis, on Cardene gtt. Cardene was not on while in CICU, as SBP was 170 upon admission to unit. Additionally, glucose >500 on admission. Given fast acting insulin and started on 22u lantus. FS 200s currently with  (without restarting PO meds yet). Pt also found to have Type 2 DM, A1c of 10.3. Atorvastatin 80 mg started as LDL >200. Pt was started on clotrimazole for penile and testicular erythema. Syphilis, HIV, UA negative.      FOR FOLLOW UP:  [] reintroduce BP meds as tolerated (has not taken these meds for >1 year)  [] follow up urine cx, consider obtaining cx of discharge or derm or urology consult  [] consider abx if w/ fever or WBC trends up  [] endo emailed, A1c 10.8 with Glucose >500 on admission  [] f/u GC chlamydia  [] f/u RUQ US for transaminitis  [] SBIRT consult placed, pt w/ hx of binge drinking and difficulty quitting smoking   [] SW consult placed, presently uninsured and unemployed- will need emergency medicaid CICU Transfer Note    Transfer from: CICU    Transfer to: (  ) Medicine    ( X ) Telemetry     (   ) RCU        (    ) Palliative         (   ) Stroke Unit       (  ) MICU       Accepting Physician: Dr. Feliciano  Room: 345W [id number]    Signout given to: Sneha Nunez    CICU COURSE:   48M HTN (w/ barriers to acquiring meds as currently unemployed and without insurance) p/w penile pain/dysuria found to have SBP >240, adm to CICU for hypertensive crisis, on Cardene gtt. Cardene was not on while in CICU, as SBP was 170 upon admission to unit. Additionally, glucose >500 on admission. Given fast acting insulin and started on 22u lantus. FS 200s currently with  (without restarting PO meds yet). Pt also found to have Type 2 DM, A1c of 10.3. Atorvastatin 80 mg started as LDL >200. Pt was started on clotrimazole for penile and testicular erythema. Syphilis, HIV, UA negative.      FOR FOLLOW UP:  [] reintroduce BP meds as tolerated (has not taken these meds for >1 year)  [] follow up urine cx, consider obtaining cx of discharge or derm or urology consult  [] consider abx if w/ fever or WBC trends up  [] endo emailed, A1c 10.8 with Glucose >500 on admission  [] f/u GC chlamydia  [] f/u RUQ US for transaminitis  [] SBIRT consult placed, pt w/ hx of binge drinking and difficulty quitting smoking   [] SW consult placed, presently uninsured and unemployed- will need emergency medicaid

## 2024-01-25 NOTE — PROGRESS NOTE ADULT - ASSESSMENT
49 yo M PMH HTN, HLD presented with penile discharge, found to have hypertensive emergency with ST changed on EKG and admitted to CCU. BP had improved w/ oral agents so no cardene gtt was not needed. Also found to be hyperglycemic with new DM.

## 2024-01-25 NOTE — PROGRESS NOTE ADULT - PROBLEM SELECTOR PLAN 1
A1c 10, was 6.0 in 2022   Not in DKA   Endocrine c/s was emailed in ICU - f/u final recs for insulin adjustment   Asked RN to provide injection teaching   SW assistance for [place] insulin coverage

## 2024-01-25 NOTE — SBIRT NOTE ADULT - NSSBIRTBRIEFINTDET_GEN_A_CORE
Patient reports drinking 3 or 4 days a 12 pack however unclear if it is always a 12 pack every time patient drinks. Patient declines resources at this time.

## 2024-01-25 NOTE — PROGRESS NOTE ADULT - SUBJECTIVE AND OBJECTIVE BOX
[facility] Division of Hospital Medicine  Jolanta Sinha DO   Available via Microsoft Teams      Patient is a 48y old  Male who presents with a chief complaint of hypertensive crisis (25 Jan 2024 07:29)      SUBJECTIVE / OVERNIGHT EVENTS:   Marlen [id number]  Patient endorses coming to the hospital for penile pain and discharge. Has been off of all medications for the past 1 year and has not seen any physicians because out of work and no medical insurance.   Currently w/o CP, SOB, HA, vision changes, palpitations   Penile pain and discharge improving w/ clotrimazole cream but still w/ itchiness     MEDICATIONS  (STANDING):  atorvastatin 40 milliGRAM(s) Oral at bedtime  clotrimazole 1% Cream 1 Application(s) Topical every 12 hours  dextrose 50% Injectable 25 Gram(s) IV Push once  dextrose 50% Injectable 12.5 Gram(s) IV Push once  dextrose 50% Injectable 25 Gram(s) IV Push once  dextrose Oral Gel 15 Gram(s) Oral once  glucagon  Injectable 1 milliGRAM(s) IntraMuscular once  heparin   Injectable 5000 Unit(s) SubCutaneous every 8 hours  influenza   Vaccine 0.5 milliLiter(s) IntraMuscular once  insulin glargine Injectable (LANTUS) 24 Unit(s) SubCutaneous at bedtime  insulin lispro (ADMELOG) corrective regimen sliding scale   SubCutaneous three times a day before meals  insulin lispro (ADMELOG) corrective regimen sliding scale   SubCutaneous at bedtime  insulin lispro Injectable (ADMELOG) 8 Unit(s) SubCutaneous three times a day before meals  insulin lispro Injectable (ADMELOG). 8 Unit(s) SubCutaneous once    MEDICATIONS  (PRN):  acetaminophen     Tablet .. 650 milliGRAM(s) Oral every 6 hours PRN Temp greater or equal to 38C (100.4F), Moderate Pain (4 - 6)  aluminum hydroxide/magnesium hydroxide/simethicone Suspension 30 milliLiter(s) Oral every 4 hours PRN Dyspepsia  melatonin 3 milliGRAM(s) Oral at bedtime PRN Insomnia  ondansetron Injectable 4 milliGRAM(s) IV Push every 8 hours PRN Nausea and/or Vomiting      CAPILLARY BLOOD GLUCOSE      POCT Blood Glucose.: 467 mg/dL (25 Jan 2024 13:05)  POCT Blood Glucose.: 528 mg/dL (25 Jan 2024 13:04)  POCT Blood Glucose.: 256 mg/dL (25 Jan 2024 08:35)  POCT Blood Glucose.: 227 mg/dL (25 Jan 2024 05:41)  POCT Blood Glucose.: 262 mg/dL (25 Jan 2024 02:24)  POCT Blood Glucose.: 329 mg/dL (24 Jan 2024 21:41)  POCT Blood Glucose.: 329 mg/dL (24 Jan 2024 19:37)  POCT Blood Glucose.: 293 mg/dL (24 Jan 2024 18:10)  POCT Blood Glucose.: 313 mg/dL (24 Jan 2024 15:43)  POCT Blood Glucose.: 393 mg/dL (24 Jan 2024 14:57)    I&O's Summary    24 Jan 2024 07:01  -  25 Jan 2024 07:00  --------------------------------------------------------  IN: 480 mL / OUT: 750 mL / NET: -270 mL        PHYSICAL EXAM:  Vital Signs Last 24 Hrs  T(C): 36.8 (25 Jan 2024 11:20), Max: 36.9 (24 Jan 2024 17:56)  T(F): 98.2 (25 Jan 2024 11:20), Max: 98.4 (24 Jan 2024 17:56)  HR: 84 (25 Jan 2024 11:20) (73 - 97)  BP: 166/105 (25 Jan 2024 11:20) (125/80 - 221/150)  BP(mean): 105 (25 Jan 2024 10:00) (92 - 145)  RR: 18 (25 Jan 2024 11:20) (10 - 25)  SpO2: 95% (25 Jan 2024 11:20) (92% - 98%)    Parameters below as of 25 Jan 2024 11:20  Patient On (Oxygen Delivery Method): room air      CONSTITUTIONAL: NAD, well-developed, well-groomed  EYES: PERRL; conjunctiva and sclera clear  ENMT: Moist oral mucosa  RESPIRATORY: Normal respiratory effort; lungs are clear to auscultation bilaterally  CARDIOVASCULAR: Regular rate and rhythm, normal S1 and S2; No lower extremity edema  ABDOMEN: Nontender to palpation, normoactive bowel sounds, no rebound/guarding  MUSCULOSKELETAL: no clubbing or cyanosis of digits; no joint swelling or tenderness to palpation  PSYCH: A+O to person, place, and time; affect appropriate  NEUROLOGY: CN 2-12 are intact and symmetric; no gross sensory deficits   SKIN: No rashes; no palpable lesions    LABS:                        14.6   7.90  )-----------( 264      ( 25 Jan 2024 01:34 )             42.9     01-25    133<L>  |  92<L>  |  14  ----------------------------<  266<H>  4.9   |  24  |  0.59    Ca    9.5      25 Jan 2024 01:34  Phos  3.5     01-25  Mg     2.2     01-25    TPro  8.2  /  Alb  4.0  /  TBili  0.7  /  DBili  x   /  AST  172<H>  /  ALT  156<H>  /  AlkPhos  136<H>  01-25          Urinalysis Basic - ( 25 Jan 2024 01:34 )    Color: x / Appearance: x / SG: x / pH: x  Gluc: 266 mg/dL / Ketone: x  / Bili: x / Urobili: x   Blood: x / Protein: x / Nitrite: x   Leuk Esterase: x / RBC: x / WBC x   Sq Epi: x / Non Sq Epi: x / Bacteria: x          RADIOLOGY & ADDITIONAL TESTS:  Results Reviewed:   Imaging Personally Reviewed:  Electrocardiogram Personally Reviewed:    COORDINATION OF CARE:  Care Discussed with Consultants/Other Providers [Y/N]:  Prior or Outpatient Records Reviewed [Y/N]:   [facility] Division of Hospital Medicine  Jolanta Sinha DO   Available via Microsoft Teams      Patient is a 48y old  Male who presents with a chief complaint of hypertensive crisis (25 Jan 2024 07:29)      SUBJECTIVE / OVERNIGHT EVENTS:   Marlen [id number]  Patient endorses coming to the hospital for penile pain and discharge. Has been off of all medications for the past 1 year and has not seen any physicians because out of work and no medical insurance.   Currently w/o CP, SOB, HA, vision changes, palpitations   Penile pain and discharge improving w/ clotrimazole cream but still w/ itchiness     MEDICATIONS  (STANDING):  atorvastatin 40 milliGRAM(s) Oral at bedtime  clotrimazole 1% Cream 1 Application(s) Topical every 12 hours  dextrose 50% Injectable 25 Gram(s) IV Push once  dextrose 50% Injectable 12.5 Gram(s) IV Push once  dextrose 50% Injectable 25 Gram(s) IV Push once  dextrose Oral Gel 15 Gram(s) Oral once  glucagon  Injectable 1 milliGRAM(s) IntraMuscular once  heparin   Injectable 5000 Unit(s) SubCutaneous every 8 hours  influenza   Vaccine 0.5 milliLiter(s) IntraMuscular once  insulin glargine Injectable (LANTUS) 24 Unit(s) SubCutaneous at bedtime  insulin lispro (ADMELOG) corrective regimen sliding scale   SubCutaneous three times a day before meals  insulin lispro (ADMELOG) corrective regimen sliding scale   SubCutaneous at bedtime  insulin lispro Injectable (ADMELOG) 8 Unit(s) SubCutaneous three times a day before meals  insulin lispro Injectable (ADMELOG). 8 Unit(s) SubCutaneous once    MEDICATIONS  (PRN):  acetaminophen     Tablet .. 650 milliGRAM(s) Oral every 6 hours PRN Temp greater or equal to 38C (100.4F), Moderate Pain (4 - 6)  aluminum hydroxide/magnesium hydroxide/simethicone Suspension 30 milliLiter(s) Oral every 4 hours PRN Dyspepsia  melatonin 3 milliGRAM(s) Oral at bedtime PRN Insomnia  ondansetron Injectable 4 milliGRAM(s) IV Push every 8 hours PRN Nausea and/or Vomiting      CAPILLARY BLOOD GLUCOSE      POCT Blood Glucose.: 467 mg/dL (25 Jan 2024 13:05)  POCT Blood Glucose.: 528 mg/dL (25 Jan 2024 13:04)  POCT Blood Glucose.: 256 mg/dL (25 Jan 2024 08:35)  POCT Blood Glucose.: 227 mg/dL (25 Jan 2024 05:41)  POCT Blood Glucose.: 262 mg/dL (25 Jan 2024 02:24)  POCT Blood Glucose.: 329 mg/dL (24 Jan 2024 21:41)  POCT Blood Glucose.: 329 mg/dL (24 Jan 2024 19:37)  POCT Blood Glucose.: 293 mg/dL (24 Jan 2024 18:10)  POCT Blood Glucose.: 313 mg/dL (24 Jan 2024 15:43)  POCT Blood Glucose.: 393 mg/dL (24 Jan 2024 14:57)    I&O's Summary    24 Jan 2024 07:01  -  25 Jan 2024 07:00  --------------------------------------------------------  IN: 480 mL / OUT: 750 mL / NET: -270 mL        PHYSICAL EXAM:  Vital Signs Last 24 Hrs  T(C): 36.8 (25 Jan 2024 11:20), Max: 36.9 (24 Jan 2024 17:56)  T(F): 98.2 (25 Jan 2024 11:20), Max: 98.4 (24 Jan 2024 17:56)  HR: 84 (25 Jan 2024 11:20) (73 - 97)  BP: 166/105 (25 Jan 2024 11:20) (125/80 - 221/150)  BP(mean): 105 (25 Jan 2024 10:00) (92 - 145)  RR: 18 (25 Jan 2024 11:20) (10 - 25)  SpO2: 95% (25 Jan 2024 11:20) (92% - 98%)    Parameters below as of 25 Jan 2024 11:20  Patient On (Oxygen Delivery Method): room air      CONSTITUTIONAL: NAD, well-developed, well-groomed  EYES: PERRL; conjunctiva and sclera clear  ENMT: Moist oral mucosa  RESPIRATORY: Normal respiratory effort; lungs are clear to auscultation bilaterally  CARDIOVASCULAR: Regular rate and rhythm, normal S1 and S2; No lower extremity edema  ABDOMEN: Nontender to palpation, normoactive bowel sounds, no rebound/guarding  MUSCULOSKELETAL: no clubbing or cyanosis of digits; no joint swelling or tenderness to palpation  PSYCH: A+O to person, place, and time; affect appropriate  NEUROLOGY: CN 2-12 are intact and symmetric; no gross sensory deficits   SKIN: No rashes; no palpable lesions, RN provided chaperone - some erythema of the scrotal area/penis, no discharge     LABS:                        14.6   7.90  )-----------( 264      ( 25 Jan 2024 01:34 )             42.9     01-25    133<L>  |  92<L>  |  14  ----------------------------<  266<H>  4.9   |  24  |  0.59    Ca    9.5      25 Jan 2024 01:34  Phos  3.5     01-25  Mg     2.2     01-25    TPro  8.2  /  Alb  4.0  /  TBili  0.7  /  DBili  x   /  AST  172<H>  /  ALT  156<H>  /  AlkPhos  136<H>  01-25          Urinalysis Basic - ( 25 Jan 2024 01:34 )    Color: x / Appearance: x / SG: x / pH: x  Gluc: 266 mg/dL / Ketone: x  / Bili: x / Urobili: x   Blood: x / Protein: x / Nitrite: x   Leuk Esterase: x / RBC: x / WBC x   Sq Epi: x / Non Sq Epi: x / Bacteria: x          RADIOLOGY & ADDITIONAL TESTS:  Results Reviewed:   Imaging Personally Reviewed:  Electrocardiogram Personally Reviewed:     COORDINATION OF CARE:  Care Discussed with Consultants/Other Providers [Y/N]:  Prior or Outpatient Records Reviewed [Y/N]:

## 2024-01-25 NOTE — CONSULT NOTE ADULT - PROBLEM SELECTOR RECOMMENDATION 3
Extremely high LDL level, consider genetic workup for familial hypercholesterolemia. Optimize statin dose.  Glycemic control recommended for hypertriglyceridemia.       Nabeel Savage D.O  [phone]

## 2024-01-25 NOTE — PROGRESS NOTE ADULT - ASSESSMENT
===Assessment===  48M with PMHx of HTN presenting for 1 week of penile dysuria w/ discharge, found to be in hypertensive urgency/emergency, now s/p nicardipine gtt, also newly found to have DM2.    ==NEURO==  -Currently A&O x 3    ==CARDIOVASCULAR==  #Hypertensive urgency/emergency   -On admission BP of 247/150  -received HCTZ 25 x1, amlodipine 10 x1, labetalol 10 x1  -s/p nicardipine gtt w/ improvement of SBP to 150-160s  -Restarted home meds: Lisinopril 40mg QD, amlodipine 10 mg QD, hydrochlorothiazide 25 mg, carvedilol 25 mg BID  -Would keep SBP at goal of 160 in first 24 hours, can then resume home medications to further optimize BP    ==PULMONARY==  -no active issues, oxygenating well on RA    ==RENAL/==  #Dysuria w/ discharge  -gonorrhea, chlamydia, syphilis, HIV negative  -UA neg, f/u ucx    #Pseudo hyponatremia  -corrects to 137  -trend Na    ==GASTROINTESTINAL==  #Transaminitis  Hx of alcohol use disorder vs. fatty liver  -RUQ US  -Follow repeat hepatic function panel     ==ENDOCRINE==  #Hyperglycemia in setting of poorly controlled Diabetes Mellitus   -BG 500s on admission, now 300s; labs not suggestive of DKA  -ISS and lantus for now, will add mealtime insulin as well to obtain inpatient BG goal of 140-180  -f/u A1c     ==INFECTIOUS==  #Penile discharge   -No fever, leukocytosis; STI testing negative so far   -Watch off antibiotics     ==HEMATOLOGIC==  -No active issues     Dispo:    Patient requires continuous monitoring with bedside rhythm monitoring, arterial line, pulse oximetry, ventilator monitoring and intermittent blood gas analysis.  Care plan discussed with ICU care team.  I have spent 35 minutes providing critical care, in addition to initial critical time provided by CICU attending Dr. Corado, re-evaluated multiple times during the day.    Hillary Vázquez PA-C   ===Assessment===  48M with PMHx of HTN presenting for 1 week of penile dysuria w/ discharge, found to be in hypertensive urgency/emergency, now s/p nicardipine gtt, also newly found to have DM2.    ==NEURO==  -Currently A&O x 3    ==CARDIOVASCULAR==  #Hypertensive urgency/emergency   -On admission BP of 247/150  -received HCTZ 25 x1, amlodipine 10 x1, labetalol 10 x1  -s/p nicardipine gtt w/ improvement of SBP to 150-160s  -home meds: Lisinopril 40mg QD, amlodipine 10 mg QD, hydrochlorothiazide 25 mg, carvedilol 25 mg BID  -Would keep SBP at goal of 140 for today  -resume lisinopril 40 mg tomorrow    ==PULMONARY==  -no active issues, oxygenating well on RA    ==RENAL/==  #Dysuria w/ discharge  -syphilis, HIV negative  -UA neg, f/u ucx  -f/u GC     #Pseudo hyponatremia  -corrects to 137  -trend Na    ==GASTROINTESTINAL==  #Transaminitis  Hx of alcohol use disorder vs. fatty liver  -RUQ US  -Follow repeat hepatic function panel     ==ENDOCRINE==  #Hyperglycemia in setting of poorly controlled Diabetes Mellitus   -BG 500s on admission, now 300s; labs not suggestive of DKA  -ISS and lantus for now, will add mealtime insulin as well to obtain inpatient BG goal of 140-180  -f/u A1c     ==INFECTIOUS==  #Penile discharge   -No fever, leukocytosis; STI testing negative so far   -Watch off antibiotics     ==HEMATOLOGIC==  -No active issues     Dispo:    Patient requires continuous monitoring with bedside rhythm monitoring, arterial line, pulse oximetry, ventilator monitoring and intermittent blood gas analysis.  Care plan discussed with ICU care team.  I have spent 35 minutes providing critical care, in addition to initial critical time provided by CICU attending Dr. Corado, re-evaluated multiple times during the day.    Hillary Vázquez PA-C

## 2024-01-25 NOTE — CONSULT NOTE ADULT - ASSESSMENT
47 yo M with a history of HTN presents to the ED with 1 week of penile pain, found to be in HTN urgency in the ED likely 2/2 to not taking home antihypertensive medication.      Impression:        Recommendations:      Recommendations not final until signed by attending.
47 y/o M w/ uncontrolled Type 2 DM w/ severe hyperglycemia and uncontrolled HTN admitted for hypertensive urgency (high risk patient with severely uncontrolled diabetes with A1c of 10.3% and glucose > 400's at high risk of CAD and CVA with high medical complexity and high level decision-making).

## 2024-01-25 NOTE — PROGRESS NOTE ADULT - SUBJECTIVE AND OBJECTIVE BOX
Gurdeep Ordonez, PGY1  Internal Medicine       PATIENT:  NOLAN CHRISTIANSEN  [id number]    CHIEF COMPLAINT:  Patient is a 48y old  Male who presents with a chief complaint of HTN urgency (24 Jan 2024 18:54)      INTERVAL HISTORY/OVERNIGHT EVENTS:    MEDICATIONS:  MEDICATIONS  (STANDING):  clotrimazole 1% Cream 1 Application(s) Topical every 12 hours  dextrose 50% Injectable 25 Gram(s) IV Push once  dextrose 50% Injectable 25 Gram(s) IV Push once  dextrose 50% Injectable 12.5 Gram(s) IV Push once  dextrose Oral Gel 15 Gram(s) Oral once  glucagon  Injectable 1 milliGRAM(s) IntraMuscular once  influenza   Vaccine 0.5 milliLiter(s) IntraMuscular once  insulin glargine Injectable (LANTUS) 22 Unit(s) SubCutaneous at bedtime  insulin lispro (ADMELOG) corrective regimen sliding scale   SubCutaneous every 4 hours  insulin lispro (ADMELOG) corrective regimen sliding scale   SubCutaneous at bedtime  insulin lispro Injectable (ADMELOG) 6 Unit(s) SubCutaneous before lunch  insulin lispro Injectable (ADMELOG) 6 Unit(s) SubCutaneous before dinner  insulin lispro Injectable (ADMELOG) 6 Unit(s) SubCutaneous before breakfast    MEDICATIONS  (PRN):  acetaminophen     Tablet .. 650 milliGRAM(s) Oral every 6 hours PRN Temp greater or equal to 38C (100.4F), Moderate Pain (4 - 6)      ALLERGIES:  Allergies    No Known Allergies    Intolerances        OBJECTIVE:  ICU Vital Signs Last 24 Hrs  T(C): 36.4 (25 Jan 2024 03:00), Max: 37.4 (24 Jan 2024 11:36)  T(F): 97.6 (25 Jan 2024 03:00), Max: 99.4 (24 Jan 2024 11:36)  HR: 82 (25 Jan 2024 06:30) (77 - 111)  BP: 143/85 (25 Jan 2024 06:30) (125/80 - 247/150)  BP(mean): 106 (25 Jan 2024 06:30) (92 - 145)  ABP: --  ABP(mean): --  RR: 11 (25 Jan 2024 06:30) (10 - 25)  SpO2: 94% (25 Jan 2024 06:30) (93% - 98%)    O2 Parameters below as of 25 Jan 2024 06:00  Patient On (Oxygen Delivery Method): room air            Adult Advanced Hemodynamics Last 24 Hrs  CVP(mm Hg): --  CVP(cm H2O): --  CO: --  CI: --  PA: --  PA(mean): --  PCWP: --  SVR: --  SVRI: --  PVR: --  PVRI: --  CAPILLARY BLOOD GLUCOSE      POCT Blood Glucose.: 227 mg/dL (25 Jan 2024 05:41)  POCT Blood Glucose.: 262 mg/dL (25 Jan 2024 02:24)  POCT Blood Glucose.: 329 mg/dL (24 Jan 2024 21:41)  POCT Blood Glucose.: 329 mg/dL (24 Jan 2024 19:37)  POCT Blood Glucose.: 313 mg/dL (24 Jan 2024 15:43)  POCT Blood Glucose.: 393 mg/dL (24 Jan 2024 14:57)    CAPILLARY BLOOD GLUCOSE      POCT Blood Glucose.: 227 mg/dL (25 Jan 2024 05:41)    I&O's Summary    24 Jan 2024 07:01  -  25 Jan 2024 07:00  --------------------------------------------------------  IN: 480 mL / OUT: 750 mL / NET: -270 mL      Daily Height in cm: 170.18 (24 Jan 2024 17:56)    Daily     PHYSICAL EXAMINATION:  General: WN/WD NAD  HEENT: PERRLA, EOMI, moist mucous membranes  Neurology: A&Ox3, nonfocal, CARRILLO x 4  Respiratory: CTA B/L, normal respiratory effort, no wheezes, crackles, rales  CV: RRR, S1S2, no murmurs, rubs or gallops  Abdominal: Soft, NT, ND +BS, Last BM  Extremities: No edema, + peripheral pulses  Incisions:   Tubes:    LABS:                          14.6   7.90  )-----------( 264      ( 25 Jan 2024 01:34 )             42.9     01-25    133<L>  |  92<L>  |  14  ----------------------------<  266<H>  4.9   |  24  |  0.59    Ca    9.5      25 Jan 2024 01:34  Phos  3.5     01-25  Mg     2.2     01-25    TPro  8.2  /  Alb  4.0  /  TBili  0.7  /  DBili  x   /  AST  172<H>  /  ALT  156<H>  /  AlkPhos  136<H>  01-25    LIVER FUNCTIONS - ( 25 Jan 2024 01:34 )  Alb: 4.0 g/dL / Pro: 8.2 g/dL / ALK PHOS: 136 U/L / ALT: 156 U/L / AST: 172 U/L / GGT: x                   Urinalysis Basic - ( 25 Jan 2024 01:34 )    Color: x / Appearance: x / SG: x / pH: x  Gluc: 266 mg/dL / Ketone: x  / Bili: x / Urobili: x   Blood: x / Protein: x / Nitrite: x   Leuk Esterase: x / RBC: x / WBC x   Sq Epi: x / Non Sq Epi: x / Bacteria: x        TELEMETRY:     EKG:     IMAGING:         PATIENT:  NOLAN CHRISTIANSEN  [id number]    CHIEF COMPLAINT:  Patient is a 48y old  Male who presents with a chief complaint of HTN urgency (24 Jan 2024 18:54)      INTERVAL HISTORY/OVERNIGHT EVENTS: NAEON.    Patient seen and examined at bedside. Feeling ok, stated penile pain is a little improved, still erythematous. Denied chest pain, SOB or leg swelling.    MEDICATIONS:  MEDICATIONS  (STANDING):  clotrimazole 1% Cream 1 Application(s) Topical every 12 hours  dextrose 50% Injectable 25 Gram(s) IV Push once  dextrose 50% Injectable 25 Gram(s) IV Push once  dextrose 50% Injectable 12.5 Gram(s) IV Push once  dextrose Oral Gel 15 Gram(s) Oral once  glucagon  Injectable 1 milliGRAM(s) IntraMuscular once  influenza   Vaccine 0.5 milliLiter(s) IntraMuscular once  insulin glargine Injectable (LANTUS) 22 Unit(s) SubCutaneous at bedtime  insulin lispro (ADMELOG) corrective regimen sliding scale   SubCutaneous every 4 hours  insulin lispro (ADMELOG) corrective regimen sliding scale   SubCutaneous at bedtime  insulin lispro Injectable (ADMELOG) 6 Unit(s) SubCutaneous before lunch  insulin lispro Injectable (ADMELOG) 6 Unit(s) SubCutaneous before dinner  insulin lispro Injectable (ADMELOG) 6 Unit(s) SubCutaneous before breakfast    MEDICATIONS  (PRN):  acetaminophen     Tablet .. 650 milliGRAM(s) Oral every 6 hours PRN Temp greater or equal to 38C (100.4F), Moderate Pain (4 - 6)      ALLERGIES:  Allergies    No Known Allergies    Intolerances        OBJECTIVE:  ICU Vital Signs Last 24 Hrs  T(C): 36.4 (25 Jan 2024 03:00), Max: 37.4 (24 Jan 2024 11:36)  T(F): 97.6 (25 Jan 2024 03:00), Max: 99.4 (24 Jan 2024 11:36)  HR: 82 (25 Jan 2024 06:30) (77 - 111)  BP: 143/85 (25 Jan 2024 06:30) (125/80 - 247/150)  BP(mean): 106 (25 Jan 2024 06:30) (92 - 145)  ABP: --  ABP(mean): --  RR: 11 (25 Jan 2024 06:30) (10 - 25)  SpO2: 94% (25 Jan 2024 06:30) (93% - 98%)    O2 Parameters below as of 25 Jan 2024 06:00  Patient On (Oxygen Delivery Method): room air            Adult Advanced Hemodynamics Last 24 Hrs  CVP(mm Hg): --  CVP(cm H2O): --  CO: --  CI: --  PA: --  PA(mean): --  PCWP: --  SVR: --  SVRI: --  PVR: --  PVRI: --  CAPILLARY BLOOD GLUCOSE      POCT Blood Glucose.: 227 mg/dL (25 Jan 2024 05:41)  POCT Blood Glucose.: 262 mg/dL (25 Jan 2024 02:24)  POCT Blood Glucose.: 329 mg/dL (24 Jan 2024 21:41)  POCT Blood Glucose.: 329 mg/dL (24 Jan 2024 19:37)  POCT Blood Glucose.: 313 mg/dL (24 Jan 2024 15:43)  POCT Blood Glucose.: 393 mg/dL (24 Jan 2024 14:57)    CAPILLARY BLOOD GLUCOSE      POCT Blood Glucose.: 227 mg/dL (25 Jan 2024 05:41)    I&O's Summary    24 Jan 2024 07:01  -  25 Jan 2024 07:00  --------------------------------------------------------  IN: 480 mL / OUT: 750 mL / NET: -270 mL      Daily Height in cm: 170.18 (24 Jan 2024 17:56)    Daily     PHYSICAL EXAMINATION:  General: WN/WD NAD  HEENT: PERRLA, EOMI, moist mucous membranes  Neurology: A&Ox3, nonfocal, CARRILLO x 4  Respiratory: CTA B/L, normal respiratory effort, no wheezes, crackles, rales  CV: RRR, S1S2, no murmurs, rubs or gallops  Abdominal: Soft, NT, ND +BS, Last BM  Extremities: No edema, + peripheral pulses  Incisions:   Tubes:    LABS:                          14.6   7.90  )-----------( 264      ( 25 Jan 2024 01:34 )             42.9     01-25    133<L>  |  92<L>  |  14  ----------------------------<  266<H>  4.9   |  24  |  0.59    Ca    9.5      25 Jan 2024 01:34  Phos  3.5     01-25  Mg     2.2     01-25    TPro  8.2  /  Alb  4.0  /  TBili  0.7  /  DBili  x   /  AST  172<H>  /  ALT  156<H>  /  AlkPhos  136<H>  01-25    LIVER FUNCTIONS - ( 25 Jan 2024 01:34 )  Alb: 4.0 g/dL / Pro: 8.2 g/dL / ALK PHOS: 136 U/L / ALT: 156 U/L / AST: 172 U/L / GGT: x                   Urinalysis Basic - ( 25 Jan 2024 01:34 )    Color: x / Appearance: x / SG: x / pH: x  Gluc: 266 mg/dL / Ketone: x  / Bili: x / Urobili: x   Blood: x / Protein: x / Nitrite: x   Leuk Esterase: x / RBC: x / WBC x   Sq Epi: x / Non Sq Epi: x / Bacteria: x        TELEMETRY:     EKG:     IMAGING:

## 2024-01-26 ENCOUNTER — TRANSCRIPTION ENCOUNTER (OUTPATIENT)
Age: 49
End: 2024-01-26

## 2024-01-26 LAB
ALBUMIN SERPL ELPH-MCNC: 3.7 G/DL — SIGNIFICANT CHANGE UP (ref 3.3–5)
ALP SERPL-CCNC: 132 U/L — HIGH (ref 40–120)
ALT FLD-CCNC: 284 U/L — HIGH (ref 10–45)
ANION GAP SERPL CALC-SCNC: 15 MMOL/L — SIGNIFICANT CHANGE UP (ref 5–17)
APTT BLD: 32.1 SEC — SIGNIFICANT CHANGE UP (ref 24.5–35.6)
AST SERPL-CCNC: 392 U/L — HIGH (ref 10–40)
BASOPHILS # BLD AUTO: 0.08 K/UL — SIGNIFICANT CHANGE UP (ref 0–0.2)
BASOPHILS NFR BLD AUTO: 1.2 % — SIGNIFICANT CHANGE UP (ref 0–2)
BILIRUB SERPL-MCNC: 0.6 MG/DL — SIGNIFICANT CHANGE UP (ref 0.2–1.2)
BUN SERPL-MCNC: 20 MG/DL — SIGNIFICANT CHANGE UP (ref 7–23)
CALCIUM SERPL-MCNC: 9.6 MG/DL — SIGNIFICANT CHANGE UP (ref 8.4–10.5)
CHLORIDE SERPL-SCNC: 96 MMOL/L — SIGNIFICANT CHANGE UP (ref 96–108)
CO2 SERPL-SCNC: 24 MMOL/L — SIGNIFICANT CHANGE UP (ref 22–31)
CREAT SERPL-MCNC: 0.7 MG/DL — SIGNIFICANT CHANGE UP (ref 0.5–1.3)
EGFR: 115 ML/MIN/1.73M2 — SIGNIFICANT CHANGE UP
EOSINOPHIL # BLD AUTO: 0.19 K/UL — SIGNIFICANT CHANGE UP (ref 0–0.5)
EOSINOPHIL NFR BLD AUTO: 2.7 % — SIGNIFICANT CHANGE UP (ref 0–6)
GLUCOSE BLDC GLUCOMTR-MCNC: 226 MG/DL — HIGH (ref 70–99)
GLUCOSE BLDC GLUCOMTR-MCNC: 238 MG/DL — HIGH (ref 70–99)
GLUCOSE BLDC GLUCOMTR-MCNC: 245 MG/DL — HIGH (ref 70–99)
GLUCOSE BLDC GLUCOMTR-MCNC: 278 MG/DL — HIGH (ref 70–99)
GLUCOSE SERPL-MCNC: 279 MG/DL — HIGH (ref 70–99)
HCT VFR BLD CALC: 44.2 % — SIGNIFICANT CHANGE UP (ref 39–50)
HGB BLD-MCNC: 14.7 G/DL — SIGNIFICANT CHANGE UP (ref 13–17)
IMM GRANULOCYTES NFR BLD AUTO: 0.3 % — SIGNIFICANT CHANGE UP (ref 0–0.9)
INR BLD: 1.15 RATIO — SIGNIFICANT CHANGE UP (ref 0.85–1.18)
LYMPHOCYTES # BLD AUTO: 2.58 K/UL — SIGNIFICANT CHANGE UP (ref 1–3.3)
LYMPHOCYTES # BLD AUTO: 37.2 % — SIGNIFICANT CHANGE UP (ref 13–44)
MAGNESIUM SERPL-MCNC: 2 MG/DL — SIGNIFICANT CHANGE UP (ref 1.6–2.6)
MCHC RBC-ENTMCNC: 31.3 PG — SIGNIFICANT CHANGE UP (ref 27–34)
MCHC RBC-ENTMCNC: 33.3 GM/DL — SIGNIFICANT CHANGE UP (ref 32–36)
MCV RBC AUTO: 94.2 FL — SIGNIFICANT CHANGE UP (ref 80–100)
MONOCYTES # BLD AUTO: 0.43 K/UL — SIGNIFICANT CHANGE UP (ref 0–0.9)
MONOCYTES NFR BLD AUTO: 6.2 % — SIGNIFICANT CHANGE UP (ref 2–14)
NEUTROPHILS # BLD AUTO: 3.64 K/UL — SIGNIFICANT CHANGE UP (ref 1.8–7.4)
NEUTROPHILS NFR BLD AUTO: 52.4 % — SIGNIFICANT CHANGE UP (ref 43–77)
NRBC # BLD: 0 /100 WBCS — SIGNIFICANT CHANGE UP (ref 0–0)
PHOSPHATE SERPL-MCNC: 3.8 MG/DL — SIGNIFICANT CHANGE UP (ref 2.5–4.5)
PLATELET # BLD AUTO: 253 K/UL — SIGNIFICANT CHANGE UP (ref 150–400)
POTASSIUM SERPL-MCNC: 3.1 MMOL/L — LOW (ref 3.5–5.3)
POTASSIUM SERPL-SCNC: 3.1 MMOL/L — LOW (ref 3.5–5.3)
PROT SERPL-MCNC: 7.3 G/DL — SIGNIFICANT CHANGE UP (ref 6–8.3)
PROTHROM AB SERPL-ACNC: 12.6 SEC — SIGNIFICANT CHANGE UP (ref 9.5–13)
RBC # BLD: 4.69 M/UL — SIGNIFICANT CHANGE UP (ref 4.2–5.8)
RBC # FLD: 12.1 % — SIGNIFICANT CHANGE UP (ref 10.3–14.5)
SODIUM SERPL-SCNC: 135 MMOL/L — SIGNIFICANT CHANGE UP (ref 135–145)
WBC # BLD: 6.94 K/UL — SIGNIFICANT CHANGE UP (ref 3.8–10.5)
WBC # FLD AUTO: 6.94 K/UL — SIGNIFICANT CHANGE UP (ref 3.8–10.5)

## 2024-01-26 PROCEDURE — 99239 HOSP IP/OBS DSCHRG MGMT >30: CPT

## 2024-01-26 PROCEDURE — 99233 SBSQ HOSP IP/OBS HIGH 50: CPT | Mod: GC

## 2024-01-26 PROCEDURE — 99233 SBSQ HOSP IP/OBS HIGH 50: CPT

## 2024-01-26 RX ORDER — HYDROCHLOROTHIAZIDE 25 MG
1 TABLET ORAL
Refills: 0 | DISCHARGE

## 2024-01-26 RX ORDER — INSULIN LISPRO 100/ML
10 VIAL (ML) SUBCUTANEOUS
Refills: 0 | Status: DISCONTINUED | OUTPATIENT
Start: 2024-01-26 | End: 2024-01-27

## 2024-01-26 RX ORDER — INSULIN GLARGINE 100 [IU]/ML
24 INJECTION, SOLUTION SUBCUTANEOUS
Qty: 1 | Refills: 0
Start: 2024-01-26 | End: 2024-02-24

## 2024-01-26 RX ORDER — ATORVASTATIN CALCIUM 80 MG/1
2 TABLET, FILM COATED ORAL
Qty: 60 | Refills: 0
Start: 2024-01-26 | End: 2024-02-24

## 2024-01-26 RX ORDER — POTASSIUM CHLORIDE 20 MEQ
40 PACKET (EA) ORAL EVERY 4 HOURS
Refills: 0 | Status: COMPLETED | OUTPATIENT
Start: 2024-01-26 | End: 2024-01-26

## 2024-01-26 RX ORDER — INSULIN LISPRO 100/ML
VIAL (ML) SUBCUTANEOUS
Refills: 0 | Status: DISCONTINUED | OUTPATIENT
Start: 2024-01-26 | End: 2024-01-27

## 2024-01-26 RX ORDER — REPAGLINIDE 1 MG/1
1 TABLET ORAL
Qty: 90 | Refills: 0
Start: 2024-01-26 | End: 2024-02-24

## 2024-01-26 RX ORDER — METFORMIN HYDROCHLORIDE 850 MG/1
1 TABLET ORAL
Qty: 60 | Refills: 0
Start: 2024-01-26

## 2024-01-26 RX ORDER — AMLODIPINE BESYLATE 2.5 MG/1
1 TABLET ORAL
Qty: 0 | Refills: 0 | DISCHARGE
Start: 2024-01-26

## 2024-01-26 RX ORDER — LISINOPRIL 2.5 MG/1
1 TABLET ORAL
Refills: 0 | DISCHARGE

## 2024-01-26 RX ORDER — AMLODIPINE BESYLATE 2.5 MG/1
5 TABLET ORAL EVERY 24 HOURS
Refills: 0 | Status: DISCONTINUED | OUTPATIENT
Start: 2024-01-27 | End: 2024-01-27

## 2024-01-26 RX ORDER — LISINOPRIL 2.5 MG/1
1 TABLET ORAL
Qty: 30 | Refills: 0
Start: 2024-01-26 | End: 2024-02-24

## 2024-01-26 RX ORDER — AMLODIPINE BESYLATE 2.5 MG/1
1 TABLET ORAL
Qty: 30 | Refills: 0
Start: 2024-01-26 | End: 2024-02-24

## 2024-01-26 RX ORDER — AMLODIPINE BESYLATE 2.5 MG/1
5 TABLET ORAL ONCE
Refills: 0 | Status: COMPLETED | OUTPATIENT
Start: 2024-01-26 | End: 2024-01-26

## 2024-01-26 RX ADMIN — INSULIN GLARGINE 24 UNIT(S): 100 INJECTION, SOLUTION SUBCUTANEOUS at 22:04

## 2024-01-26 RX ADMIN — HEPARIN SODIUM 5000 UNIT(S): 5000 INJECTION INTRAVENOUS; SUBCUTANEOUS at 05:20

## 2024-01-26 RX ADMIN — HEPARIN SODIUM 5000 UNIT(S): 5000 INJECTION INTRAVENOUS; SUBCUTANEOUS at 13:19

## 2024-01-26 RX ADMIN — Medication 1 APPLICATION(S): at 17:49

## 2024-01-26 RX ADMIN — Medication 1 APPLICATION(S): at 05:19

## 2024-01-26 RX ADMIN — Medication 2: at 13:18

## 2024-01-26 RX ADMIN — Medication 3: at 17:48

## 2024-01-26 RX ADMIN — Medication 8 UNIT(S): at 09:21

## 2024-01-26 RX ADMIN — Medication 40 MILLIEQUIVALENT(S): at 13:19

## 2024-01-26 RX ADMIN — Medication 10 UNIT(S): at 17:48

## 2024-01-26 RX ADMIN — Medication 2: at 09:20

## 2024-01-26 RX ADMIN — ATORVASTATIN CALCIUM 40 MILLIGRAM(S): 80 TABLET, FILM COATED ORAL at 21:58

## 2024-01-26 RX ADMIN — Medication 10 UNIT(S): at 13:19

## 2024-01-26 RX ADMIN — LISINOPRIL 40 MILLIGRAM(S): 2.5 TABLET ORAL at 05:22

## 2024-01-26 RX ADMIN — Medication 40 MILLIEQUIVALENT(S): at 11:24

## 2024-01-26 RX ADMIN — AMLODIPINE BESYLATE 5 MILLIGRAM(S): 2.5 TABLET ORAL at 11:24

## 2024-01-26 RX ADMIN — HEPARIN SODIUM 5000 UNIT(S): 5000 INJECTION INTRAVENOUS; SUBCUTANEOUS at 21:58

## 2024-01-26 NOTE — PHARMACOTHERAPY INTERVENTION NOTE - COMMENTS
Educated patient on reason for insulin use (high A1C, meaning of A1C, A1C goal), pathophysiology/ role of insulin in our body, complications of uncontrolled DM and signs of hypoglycemia and what to do in event of hypoglycemia.     Showed patient how to use insulin pen injection for patient using saline demokit- reviewed storage, injection sites, administration steps, and disposal of pen needles. Patient was able to perform teach back and demonstrate proper technique.  Overall, patient feels confident and demonstrates competency in preparing for, and administration of, insulin. Patient knows to rotate injection site. Instruction provided to check blood sugars daily and document readings in a log/diary and follow up with provider for adjustments. Patient understands importance of compliance.    Abelardo Higgins, PharmD  Transitions of Care Pharmacist  Available on Microsoft Teams  Spectra #[phone]

## 2024-01-26 NOTE — PROGRESS NOTE ADULT - ASSESSMENT
47 yo M PMH HTN, HLD presented with penile discharge, found to have hypertensive emergency with ST changed on EKG and admitted to CCU. BP had improved w/ oral agents so no cardene gtt was not needed. Also found to be hyperglycemic with new DM.

## 2024-01-26 NOTE — DIETITIAN INITIAL EVALUATION ADULT - NS FNS DIET ORDER
Diet, Consistent Carbohydrate/No Snacks:   DASH/TLC {Sodium & Cholesterol Restricted} (DASH) (01-25-24 @ 07:50) [Active]

## 2024-01-26 NOTE — DIETITIAN INITIAL EVALUATION ADULT - NSFNSGIIOFT_GEN_A_CORE
- Pt denies nausea, vomiting, diarrhea, or constipation.   - Last BM:; not currently ordered for bowel regimen

## 2024-01-26 NOTE — DIETITIAN INITIAL EVALUATION ADULT - ENERGY INTAKE
Adequate (%) - Pt reports good appetite/PO intake since admission, consuming ~% of most meals. Pt made aware of menu ordering procedures in house.

## 2024-01-26 NOTE — DISCHARGE NOTE PROVIDER - NSDCFUADDAPPT_GEN_ALL_CORE_FT
APPTS ARE READY TO BE MADE: [x] YES    Best Family or Patient Contact (if needed):    Additional Information about above appointments (if needed):    1:   2:   3:     Other comments or requests:    APPTS ARE READY TO BE MADE: [x] YES    Best Family or Patient Contact (if needed):    Additional Information about above appointments (if needed):    1:   2:   3:     Other comments or requests:   Patient was provided with follow up request details and was advised to call to schedule follow up within specified time frame. No scheduling assistance is needed at this time.

## 2024-01-26 NOTE — DIETITIAN INITIAL EVALUATION ADULT - OTHER INFO
- Endo: Newly dx DM; HbA1c 10.1% (1/25/2024); suggests poor glycemic control. BG being managed with Lispro 10u TID, Glargine 24u at bedtime and SSI.   - Renal: Noted hypokalemic; s/p repletion.   - Cardiology: "HTN emergency -BP remains poorly controlled. Will need additional anti-hypertensives. BP goal <130/90"

## 2024-01-26 NOTE — DISCHARGE NOTE PROVIDER - NSFOLLOWUPCLINICS_GEN_ALL_ED_FT
[facility] - Primary Care  Primary Care  [address] [place] [address]  Phone: [phone]  Fax:   Follow Up Time: 1 week    [facility] Endocrinology  Endocrinology  [address] [place] [address]  Phone: [phone]  Fax:   Follow Up Time: 1 week

## 2024-01-26 NOTE — DIETITIAN INITIAL EVALUATION ADULT - ADD RECOMMEND
1) Continue current diet as prescribed   2)  1) Continue current diet as prescribed   2) Monitor and encourage PO intake. Encourage use of daily menus. Honor dietary preferences as expressed as able.   3) Reinforce DM education as needed/able

## 2024-01-26 NOTE — DISCHARGE NOTE PROVIDER - HOSPITAL COURSE
HPI:  47 yo M w/ PMHx of HTN presents for 1 week of penile pain associated with dysuria and localized swelling. Pt stated that he ran out of his home medications as he does not have health insurance presently. He states that he has 1 sexual partner, his wife.  He stated that he bought an over-the-counter medication clotrimazole cream and has been applying it with some relief, however continues to have pain.  He does not have insurance as it  and he is currently not working so he has been off his antihypertensive medication.  Pt denied chest pain, SOB, headache, diplopia or other changes.  He states that he intermediately notices there is white discharge from the penile region. Pt has not taken medications for over 1 year, has not seen doctors for 1-2 years.     In ED, pt was found to have BP of 247/150 and hyperglycemic to 393. He received HCTZ 25 x1, amlodipine 10 x1, labetalol 10 x1, 8u admelog, nicardipine gtt.  	  Home Medications: Lisinopril 40mg QD, amlodipine 10 mg QD, hydrochlorothiazide 25 mg, carvedilol 25 mg BID (2024 17:37)    Hospital Course:  48M HTN (w/ barriers to acquiring meds as currently unemployed and without insurance) p/w penile pain/dysuria found to have SBP >240, adm to CICU for hypertensive crisis, on Cardene gtt. Cardene was not on while in CICU, as SBP was 170 upon admission to unit. Additionally, glucose >500 on admission. Given fast acting insulin and started on 22u lantus. FS 200s currently with  (without restarting PO meds yet). Pt also found to have Type 2 DM, A1c of 10.3. Atorvastatin 80 mg started as LDL >200. Pt was started on clotrimazole for penile and testicular erythema. Syphilis, HIV, UA negative. Transferred to telemetry floor.      Important Medication Changes and Reason:    Active or Pending Issues Requiring Follow-up:    Advanced Directives:   [ ] Full code  [ ] DNR  [ ] Hospice    Discharge Diagnoses:         HPI:  47 yo M w/ PMHx of HTN presents for 1 week of penile pain associated with dysuria and localized swelling. Pt stated that he ran out of his home medications as he does not have health insurance presently. He states that he has 1 sexual partner, his wife.  He stated that he bought an over-the-counter medication clotrimazole cream and has been applying it with some relief, however continues to have pain.  He does not have insurance as it  and he is currently not working so he has been off his antihypertensive medication.  Pt denied chest pain, SOB, headache, diplopia or other changes.  He states that he intermediately notices there is white discharge from the penile region. Pt has not taken medications for over 1 year, has not seen doctors for 1-2 years.     In ED, pt was found to have BP of 247/150 and hyperglycemic to 393. He received HCTZ 25 x1, amlodipine 10 x1, labetalol 10 x1, 8u admelog, nicardipine gtt.  	  Home Medications: Lisinopril 40mg QD, amlodipine 10 mg QD, hydrochlorothiazide 25 mg, carvedilol 25 mg BID (2024 17:37)    Hospital Course:  48M HTN (w/ barriers to acquiring meds as currently unemployed and without insurance) p/w penile pain/dysuria found to have SBP >240, adm to CICU for hypertensive crisis, on Cardene gtt. Cardene was not on while in CICU, as SBP was 170 upon admission to unit. Additionally, glucose >500 on admission. Given fast acting insulin and started on 22u lantus. FS 200s currently with  (without restarting PO meds yet). Pt also found to have Type 2 DM, A1c of 10.3. Atorvastatin 80 mg started as LDL >200. Pt was started on clotrimazole for penile and testicular erythema. Syphilis, HIV, UA negative. Transferred to telemetry floor. BP remains poorly controlled. cont   lisinopril 40mg daily initially and  added  amlodipine 5mg daily. BP now improved. Endocrine consulted for -New DM.  Blood glucose controlled on basal/bolus. Diabetes education initiated. LFT elevated. US Abdomen Upper Quadrant Right (24 @ 20:43) > Hepatomegaly with hepatic steatosis. Slightly irregular surface contour of the liver may represent changes of cirrhosis. Pt to follow up at   Medicine clinic for follow up evaluation. Pt uninsured. social work help appreciated.   BP has been controlled. BS controlled. Medically cleared for discharge Home with medicine, endocrine follow up. pT discharge don basal insulin + oral metformin and prandin.     Important Medication Changes and Reason:    Active or Pending Issues Requiring Follow-up:    Advanced Directives:   [ x] Full code  [ ] DNR  [ ] Hospice    Discharge Diagnoses:  HTN urgency  Candida genital infection  New uncontrolled diabetes type 2   Transaminitis

## 2024-01-26 NOTE — DIETITIAN INITIAL EVALUATION ADULT - ORAL INTAKE PTA/DIET HISTORY
Pt reports having a good appetite and PO intake PTA; consuming >75% of most meals. Follows regular diet. Pt denies any known food allergies or intolerances. Pt denies any micronutrient supplementation at home. Denies any difficulty chewing/swallowing at this time.  Pt reports having a good appetite and PO intake PTA; consumes 3 meals a day. Follows regular diet. Diet consists of chicken, rice, pupusas, tortillas. Pt denies any known food allergies or intolerances. Pt denies any micronutrient supplementation at home. Denies any difficulty chewing/swallowing at this time.

## 2024-01-26 NOTE — PROGRESS NOTE ADULT - ASSESSMENT
49 y/o M w/ newly diagnosed uncontrolled Type 2 DM (10.3%), uncontrolled/ untreated HTN and new hyperlipidemia. Here with c/o penile discharge found  to have hypertensive urgency. Tolerating POs with BG levels still >200s. Will continue to adjust insulin to BG goal 100 to 180s. No hypoglycemia.    Met with patient and reviewed the following:  -New DM/HTN/HLD diagnosis and importance of tx and f/u  -A1c LEVEL: Present and goal  -Blood glucose goals: 100s to 150s as out pt  -Glucose monitoring frequency: ac and hs  -Healthy eating and portion control. My Plate reviewed in depth  -Insulin(s) action, time of administration and side effects. Basal insulin need plus oral meds.   -Importance of follow up care. Uninsured> pt to f/u at endo clinic  Pt able to verbalize understanding regarding the need for glucose monitoring, diet, DM meds and follow up care. All questions answered. Pt somewhat afraid of injecting insulin> spoke to RN to start teaching   49 y/o M w/ newly diagnosed uncontrolled Type 2 DM (10.3%), uncontrolled/ untreated HTN and new hyperlipidemia. Here with c/o penile discharge found  to have hypertensive urgency. Tolerating POs with BG levels still >200s. Will continue to adjust insulin to BG goal 100 to 180s. No hypoglycemia.    Met with patient and reviewed the following:  -New DM/HTN/HLD diagnosis and importance of tx and f/u  -A1c LEVEL: Present and goal  -Blood glucose goals: 100s to 150s as out pt  -Glucose monitoring frequency: ac and hs  -Healthy eating and portion control. My Plate reviewed in depth  -ETOH and DM  -Insulin(s) action, time of administration and side effects. Basal insulin need plus oral meds.   -Importance of follow up care. Uninsured> pt to f/u at endo clinic  Pt able to verbalize understanding regarding the need for glucose monitoring, diet, DM meds and follow up care. All questions answered. Pt somewhat afraid of injecting insulin> spoke to RN to start teaching

## 2024-01-26 NOTE — PROGRESS NOTE ADULT - ASSESSMENT
48M with PMHx of HTN presenting for 1 week of penile dysuria w/ discharge, found to be in hypertensive urgency/emergency, now s/p nicardipine gtt, also newly found to have DM2.    #HTN emergency   BP remains poorly controlled. Will need additional anti-hypertensives. BP goal <130/90  -continue lisinopril 40mg daily  -start amlodipine 5mg daily  -can likely discharge today with PCP follow up for management of HTN, DM   -will need BP machine Rx on discharge to monitor at home     Please see attending attestation for final recommendations.     Jovani Matthews MD  Cardiology Fellow    48M with PMHx of HTN presenting for 1 week of penile dysuria w/ discharge, found to be in hypertensive urgency/emergency, now s/p nicardipine gtt, also newly found to have DM2.      REC:  #HTN emergency -BP remains poorly controlled. Will need additional anti-hypertensives. BP goal <130/90  - continue lisinopril 40mg daily  - start amlodipine 5mg daily  - can likely discharge today with PCP follow up for management of HTN, DM   - will need BP machine Rx on discharge to monitor at home   - stressed with patient importance of compliance with meds and regular f/u with his M.D.    Jovani Matthews MD  Cardiology Fellow     Plan discussed with cardiology fellow.  Patient seen and examined.  Hx., exam and labs as above.  I agree with the assessment and recommendations, which I have reviewed and edited where appropriate.  David Calloway M.D.  Cardiology Attending, Consult Service    For Cardiology consults and questions, all Cardiology service information can be found 24/7 on [email or web address] - use password: [email or web address] to log in.    48M with PMHx of HTN and DM presenting for 1 week of penile dysuria w/ discharge, found to be in hypertensive urgency/emergency, now s/p nicardipine gtt, also newly found to have DM2.      REC:  #HTN emergency -BP remains poorly controlled. Will need additional anti-hypertensives. BP goal <130/90  - continue lisinopril 40mg daily  - start amlodipine 5mg daily  - can likely discharge today with PCP follow up for management of HTN, DM   - will need BP machine Rx on discharge to monitor at home   - stressed with patient importance of compliance with meds and regular f/u with his M.D.    Jovani Matthews MD  Cardiology Fellow     Plan discussed with cardiology fellow.  Patient seen and examined.  Hx., exam and labs as above.  I agree with the assessment and recommendations, which I have reviewed and edited where appropriate.  David Calloway M.D.  Cardiology Attending, Consult Service    For Cardiology consults and questions, all Cardiology service information can be found 24/7 on [email or web address] - use password: [email or web address] to log in.

## 2024-01-26 NOTE — DISCHARGE NOTE PROVIDER - NSDCCPCAREPLAN_GEN_ALL_CORE_FT
PRINCIPAL DISCHARGE DIAGNOSIS  Diagnosis: Hypertensive urgency  Assessment and Plan of Treatment: Low salt diet  Activity as tolerated.  Take all medication as prescribed.  Follow up with your medical doctor for routine blood pressure monitoring at your next visit.  Notify your doctor if you have any of the following symptoms:   Dizziness, Lightheadedness, Blurry vision, Headache, Chest pain, Shortness of breath        SECONDARY DISCHARGE DIAGNOSES  Diagnosis: Uncontrolled type 2 diabetes mellitus with hyperglycemia  Assessment and Plan of Treatment: HgA1C this admission 10.2  Make sure you get your HgA1c checked every three months.  If you take oral diabetes medications, check your blood glucose two times a day.  If you take insulin, check your blood glucose before meals and at bedtime.  It's important not to skip any meals.  Keep a log of your blood glucose results and always take it with you to your doctor appointments.  Keep a list of your current medications including injectables and over the counter medications and bring this medication list with you to all your doctor appointments.  If you have not seen your ophthalmologist this year call for appointment.  Check your feet daily for redness, sores, or openings. Do not self treat. If no improvement in two days call your primary care physician for an appointment.  Low blood sugar (hypoglycemia) is a blood sugar below 70mg/dl. Check your blood sugar if you feel signs/symptoms of hypoglycemia. If your blood sugar is below 70 take 15 grams of carbohydrates (ex 4 oz of apple juice, 3-4 glucose tablets, or 4-6 oz of regular soda) wait 15 minutes and repeat blood sugar to make sure it comes up above 70.  If your blood sugar is above 70 and you are due for a meal, have a meal.  If you are not due for a meal have a snack.  This snack helps keeps your blood sugar at a safe range.      Diagnosis: Hyperlipidemia  Assessment and Plan of Treatment: continue lipitor    Diagnosis: Candidal balanoposthitis  Assessment and Plan of Treatment: continue clotrimazole cream which is over the counter    Diagnosis: Transaminitis  Assessment and Plan of Treatment: you had a abdominal US that showed possible early cirrhosis  Follow up with pcp for further testing

## 2024-01-26 NOTE — PROGRESS NOTE ADULT - PROBLEM SELECTOR PLAN 1
-Test BG ac and hs and add 2am  -C/w Lantus 24 units qhs for now> didn't get dinner until lat elast nigth and didn't receive meal time insulin with it  -Increase Admelog to 10 units qac  -C/w Low correction scale qac + bedtime and add 2am scale as well  -Needs RD consult  -C-peptide not ordered.  -Will follow  -Please teach and allow pt to do own finger sticks and insulin injections under RN supervision  Please teach use of insulin pen  Please document teach back. Spoke to RN and clinical pharmacy  Discharge:  Plan to d/c on basal + orals depending on insulin requirements. Likely Basaglar plus Prandin plus Metformin. DTB   Pt is uninsured/ unemployed > SW getting insulin from [facility]  Please write Rxs for: Solo Star Insulin pen/Beti insulin pen needles/glucose meter/strips/lancets   -Will need home care upon discharge due to new DM diagnosis and new insulin therapy  Outpt. endo follow-up. Will email endo clinic for f/u apt  Outpt. optho, podiatry, nephrology -Test BG ac and hs and add 2am  -C/w Lantus 24 units qhs for now> didn't get dinner until lat elast nigth and didn't receive meal time insulin with it  -Increase Admelog to 10 units qac  -C/w Low correction scale qac + bedtime and add 2am scale as well  -Needs RD consult  -C-peptide not ordered.  -Will follow  -Please teach and allow pt to do own finger sticks and insulin injections under RN supervision  Please teach use of insulin pen  Please document teach back. Spoke to RN and clinical pharmacy  Discharge:  Plan to d/c on basal + orals depending on insulin requirements. Likely Basaglar plus Prandin. DTB  -Can benefit from Metformin as out pt. Needs to f/u LFT (elevated) and pt w/h/o ETOH abuse.    Pt is uninsured/ unemployed > SW getting insulin from [facility]  Please write Rxs for: Solo Star Insulin pen/Beti insulin pen needles/glucose meter/strips/lancets   -Will need home care upon discharge due to new DM diagnosis and new insulin therapy  Outpt. endo follow-up. Will email endo clinic for f/u apt  Outpt. optho, podiatry, nephrology

## 2024-01-26 NOTE — DIETITIAN INITIAL EVALUATION ADULT - REASON INDICATOR FOR ASSESSMENT
Nutrition consult warranted for: nutrition services assessment and education   Information obtained from: electronic medical record and patient. Of note, pt is Bulgarian speaking; RD is fluent in this language.   Chart reviewed, events noted.

## 2024-01-26 NOTE — PROGRESS NOTE ADULT - PROBLEM SELECTOR PLAN 1
A1c 10, was 6.0 in 2022   Not in DKA   Endocrine recs appreciated  Diabetic education provided  SW assistance for [place] insulin coverage

## 2024-01-26 NOTE — PROGRESS NOTE ADULT - SUBJECTIVE AND OBJECTIVE BOX
DIABETES FOLLOW UP NOTE: Saw pt earlier today    Chief Complaint: Endocrine consult requested for management of T2DM    INTERVAL HX: Pt stable, reports tolerating POs with BG levels still >200s this am while on present insulin doses. No hypoglycemia. Pt reports he still having penile discharge. Denies polies.   Pt has no knowledge about DM even though his brother has DM.       Review of Systems:  General: As above  Cardiovascular: No chest pain, palpitations  Respiratory: No SOB, no cough  GI: No nausea, vomiting, abdominal pain  Endocrine: No polyuria, polydipsia or S&Sx of hypoglycemia    Allergies    No Known Allergies    Intolerances      MEDICATIONS:  atorvastatin 40 milliGRAM(s) Oral at bedtime  clotrimazole 1% Cream 1 Application(s) Topical every 12 hours  insulin glargine Injectable (LANTUS) 24 Unit(s) SubCutaneous at bedtime  insulin lispro (ADMELOG) corrective regimen sliding scale   SubCutaneous three times a day before meals  insulin lispro (ADMELOG) corrective regimen sliding scale   SubCutaneous at bedtime  insulin lispro Injectable (ADMELOG) 8 Unit(s) SubCutaneous three times a day before meals      PHYSICAL EXAM:  VITALS: T(C): 36.6 (01-26-24 @ 04:13)  T(F): 97.9 (01-26-24 @ 04:13), Max: 98.8 (01-25-24 @ 22:09)  HR: 75 (01-26-24 @ 04:13) (75 - 84)  BP: 154/101 (01-26-24 @ 04:13) (154/101 - 166/110)  RR:  (18 - 18)  SpO2:  (95% - 97%)  Wt(kg): --  GENERAL: Male sitting at edge of bed  in NAD  Abdomen: Soft, nontender, non distended, central adiposity  Extremities: Warm, no edema in all 4 exts  NEURO: A&O X3. Encounter done in Japanese    LABS:  POCT Blood Glucose.: 245 mg/dL (01-26-24 @ 08:48)  POCT Blood Glucose.: 289 mg/dL (01-25-24 @ 22:03)  POCT Blood Glucose.: 262 mg/dL (01-25-24 @ 19:00)  POCT Blood Glucose.: 272 mg/dL (01-25-24 @ 17:15)  POCT Blood Glucose.: 467 mg/dL (01-25-24 @ 13:05)  POCT Blood Glucose.: 528 mg/dL (01-25-24 @ 13:04)  POCT Blood Glucose.: 256 mg/dL (01-25-24 @ 08:35)  POCT Blood Glucose.: 227 mg/dL (01-25-24 @ 05:41)  POCT Blood Glucose.: 262 mg/dL (01-25-24 @ 02:24)  POCT Blood Glucose.: 329 mg/dL (01-24-24 @ 21:41)  POCT Blood Glucose.: 329 mg/dL (01-24-24 @ 19:37)  POCT Blood Glucose.: 293 mg/dL (01-24-24 @ 18:10)  POCT Blood Glucose.: 313 mg/dL (01-24-24 @ 15:43)  POCT Blood Glucose.: 393 mg/dL (01-24-24 @ 14:57)                            14.7   6.94  )-----------( 253      ( 26 Jan 2024 06:52 )             44.2       01-26    135  |  96  |  20  ----------------------------<  279<H>  3.1<L>   |  24  |  0.70    eGFR: 115    Ca    9.6      01-26  Mg     2.0     01-26  Phos  3.8     01-26    TPro  7.3  /  Alb  3.7  /  TBili  0.6  /  DBili  x   /  AST  392<H>  /  ALT  284<H>  /  AlkPhos  132<H>  01-26      Thyroid Function Tests:  01-25 @ 00:39 TSH 1.59 FreeT4 -- T3 -- Anti TPO -- Anti Thyroglobulin Ab -- TSI --      A1C with Estimated Average Glucose Result: 10.1 % (01-25-24 @ 01:34)  A1C with Estimated Average Glucose Result: 10.3 % (01-25-24 @ 00:38)      Estimated Average Glucose: 243 mg/dL (01-25-24 @ 01:34)  Estimated Average Glucose: 249 mg/dL (01-25-24 @ 00:38)        01-25 Chol 315<H> Direct LDL -- LDL calculated 200<H> HDL 35<L> Trig 392<H>

## 2024-01-26 NOTE — PROGRESS NOTE ADULT - SUBJECTIVE AND OBJECTIVE BOX
[facility] Division of Hospital Medicine  Laurita Fajardo MD  Available on TEAMS 8a-8p      Patient is a 48y old  Male who presents with a chief complaint of hypertensive crisis       SUBJECTIVE / OVERNIGHT EVENTS: No acute events    MEDICATIONS  (STANDING):  atorvastatin 40 milliGRAM(s) Oral at bedtime  clotrimazole 1% Cream 1 Application(s) Topical every 12 hours  dextrose 50% Injectable 25 Gram(s) IV Push once  dextrose 50% Injectable 12.5 Gram(s) IV Push once  dextrose 50% Injectable 25 Gram(s) IV Push once  dextrose Oral Gel 15 Gram(s) Oral once  glucagon  Injectable 1 milliGRAM(s) IntraMuscular once  heparin   Injectable 5000 Unit(s) SubCutaneous every 8 hours  influenza   Vaccine 0.5 milliLiter(s) IntraMuscular once  insulin glargine Injectable (LANTUS) 24 Unit(s) SubCutaneous at bedtime  insulin lispro (ADMELOG) corrective regimen sliding scale   SubCutaneous three times a day before meals  insulin lispro (ADMELOG) corrective regimen sliding scale   SubCutaneous at bedtime  insulin lispro Injectable (ADMELOG) 8 Unit(s) SubCutaneous three times a day before meals  insulin lispro Injectable (ADMELOG). 8 Unit(s) SubCutaneous once    MEDICATIONS  (PRN):  acetaminophen     Tablet .. 650 milliGRAM(s) Oral every 6 hours PRN Temp greater or equal to 38C (100.4F), Moderate Pain (4 - 6)  aluminum hydroxide/magnesium hydroxide/simethicone Suspension 30 milliLiter(s) Oral every 4 hours PRN Dyspepsia  melatonin 3 milliGRAM(s) Oral at bedtime PRN Insomnia  ondansetron Injectable 4 milliGRAM(s) IV Push every 8 hours PRN Nausea and/or Vomiting      PHYSICAL EXAM:  Vital Signs Last 24 Hrs  T(C): 36.7 (26 Jan 2024 11:53), Max: 37.1 (25 Jan 2024 22:09)  T(F): 98 (26 Jan 2024 11:53), Max: 98.8 (25 Jan 2024 22:09)  HR: 78 (26 Jan 2024 11:53) (72 - 84)  BP: 129/90 (26 Jan 2024 12:22) (129/90 - 166/110)  BP(mean): --  RR: 18 (26 Jan 2024 11:53) (18 - 18)  SpO2: 97% (26 Jan 2024 11:53) (95% - 97%)    Parameters below as of 26 Jan 2024 11:53  Patient On (Oxygen Delivery Method): room air      CONSTITUTIONAL: NAD, well-developed, well-groomed  EYES: PERRL; conjunctiva and sclera clear  ENMT: Moist oral mucosa  RESPIRATORY: Normal respiratory effort; lungs are clear to auscultation bilaterally  CARDIOVASCULAR: Regular rate and rhythm, normal S1 and S2; No lower extremity edema  ABDOMEN: Nontender to palpation, normoactive bowel sounds, no rebound/guarding  MUSCULOSKELETAL: no clubbing or cyanosis of digits; no joint swelling or tenderness to palpation  PSYCH: A+O to person, place, and time; affect appropriate  NEUROLOGY: CN 2-12 are intact and symmetric; no gross sensory deficits   SKIN: No rashes; no palpable lesions    LABS: reviewed                                        14.7   6.94  )-----------( 253      ( 26 Jan 2024 06:52 )             44.2       01-26    135  |  96  |  20  ----------------------------<  279<H>  3.1<L>   |  24  |  0.70    Ca    9.6      26 Jan 2024 06:52  Phos  3.8     01-26  Mg     2.0     01-26    TPro  7.3  /  Alb  3.7  /  TBili  0.6  /  DBili  x   /  AST  392<H>  /  ALT  284<H>  /  AlkPhos  132<H>  01-26              Urinalysis Basic - ( 26 Jan 2024 06:52 )    Color: x / Appearance: x / SG: x / pH: x  Gluc: 279 mg/dL / Ketone: x  / Bili: x / Urobili: x   Blood: x / Protein: x / Nitrite: x   Leuk Esterase: x / RBC: x / WBC x   Sq Epi: x / Non Sq Epi: x / Bacteria: x        PT/INR - ( 26 Jan 2024 06:52 )   PT: 12.6 sec;   INR: 1.15 ratio         PTT - ( 26 Jan 2024 06:52 )  PTT:32.1 sec          CAPILLARY BLOOD GLUCOSE      POCT Blood Glucose.: 238 mg/dL (26 Jan 2024 12:56)

## 2024-01-26 NOTE — DIETITIAN INITIAL EVALUATION ADULT - PHYSCIAL ASSESSMENT
Weight Hx Per:  - Source:   - UBW:   - Reported weight changes:    Weight Hx Per [facility] HIE:  - 160.1 pounds (8/30/2022)    Current Admission Weights:  - Dosing weight: 168.1 pounds/76.5 kg (1/24)  - Daily weight: 77.2 kg (1/26)    Weight Change:  -     **  Will continue to monitor weight trends as available/able.     IBW: 148 pounds   %IBW: 114% Weight Hx Per:  - Source: patient   - UBW: pt does not know UBW  - Reported weight changes: Pt denies changes in weight     [facility]  - 160.1 pounds (8/30/2022)    Current Admission Weights:  - Dosing weight: 168.1 pounds/76.5 kg (1/24)  - Daily weight: 77.2 kg (1/26)    Weight Change:  - none noted     **  Will continue to monitor weight trends as available/able.     IBW: 148 pounds   %IBW: 114%

## 2024-01-26 NOTE — DIETITIAN INITIAL EVALUATION ADULT - PERTINENT MEDS FT
MEDICATIONS  (STANDING):  atorvastatin 40 milliGRAM(s) Oral at bedtime  clotrimazole 1% Cream 1 Application(s) Topical every 12 hours  dextrose 50% Injectable 25 Gram(s) IV Push once  dextrose 50% Injectable 25 Gram(s) IV Push once  dextrose 50% Injectable 12.5 Gram(s) IV Push once  dextrose Oral Gel 15 Gram(s) Oral once  glucagon  Injectable 1 milliGRAM(s) IntraMuscular once  heparin   Injectable 5000 Unit(s) SubCutaneous every 8 hours  influenza   Vaccine 0.5 milliLiter(s) IntraMuscular once  insulin glargine Injectable (LANTUS) 24 Unit(s) SubCutaneous at bedtime  insulin lispro (ADMELOG) corrective regimen sliding scale   SubCutaneous <User Schedule>  insulin lispro (ADMELOG) corrective regimen sliding scale   SubCutaneous three times a day before meals  insulin lispro Injectable (ADMELOG) 10 Unit(s) SubCutaneous three times a day before meals  lisinopril 40 milliGRAM(s) Oral daily    MEDICATIONS  (PRN):  acetaminophen     Tablet .. 650 milliGRAM(s) Oral every 6 hours PRN Temp greater or equal to 38C (100.4F), Moderate Pain (4 - 6)  aluminum hydroxide/magnesium hydroxide/simethicone Suspension 30 milliLiter(s) Oral every 4 hours PRN Dyspepsia  melatonin 3 milliGRAM(s) Oral at bedtime PRN Insomnia  ondansetron Injectable 4 milliGRAM(s) IV Push every 8 hours PRN Nausea and/or Vomiting

## 2024-01-26 NOTE — DIETITIAN INITIAL EVALUATION ADULT - PERSON TAUGHT/METHOD
The following were discussed with the pt: carbohydrate sources, pairing carbohydrates with proteins at meals, protein sources, portion sizes, balanced plate, consistent carbohydrate intake, relationship of carbs and blood sugar, necessary diet modifications. Written education also provided./verbal instruction/patient instructed The following were discussed with the pt: carbohydrate sources, pairing carbohydrates with proteins at meals, protein sources, portion sizes, balanced plate, consistent carbohydrate intake, relationship of carbs and blood sugar, necessary diet modifications. Written education also provided.  Provided education on heart healthy nutrition therapy. Recommended limited salt intake. Reviewed foods high in salt and amount of salt recommended per day. Discussed nutrition label reading. Stressed the importance of limited fried foods and saturated fat consumption./verbal instruction/patient instructed

## 2024-01-26 NOTE — DISCHARGE NOTE PROVIDER - NSDCFUSCHEDAPPT_GEN_ALL_CORE_FT
[facility]  END[address]  Scheduled Appointment: 04/11/2024

## 2024-01-26 NOTE — DIETITIAN INITIAL EVALUATION ADULT - PERTINENT LABORATORY DATA
01-26    135  |  96  |  20  ----------------------------<  279<H>  3.1<L>   |  24  |  0.70    Ca    9.6      26 Jan 2024 06:52  Phos  3.8     01-26  Mg     2.0     01-26    TPro  7.3  /  Alb  3.7  /  TBili  0.6  /  DBili  x   /  AST  392<H>  /  ALT  284<H>  /  AlkPhos  132<H>  01-26  POCT Blood Glucose.: 238 mg/dL (01-26-24 @ 12:56)  A1C with Estimated Average Glucose Result: 10.1 % (01-25-24 @ 01:34)  A1C with Estimated Average Glucose Result: 10.3 % (01-25-24 @ 00:38)

## 2024-01-26 NOTE — PROGRESS NOTE ADULT - SUBJECTIVE AND OBJECTIVE BOX
Patient seen and examined at bedside.    Overnight Events:   NAEO   BP elevated   expressed importance of medication adherence and PCP follow up on discharge   Denies any acute complaints     REVIEW OF SYSTEMS:  CONSTITUTIONAL: No weakness, fevers or chills  EYES/ENT: No visual changes;  No dysphagia  NECK: No pain or stiffness  RESPIRATORY: No cough, wheezing, hemoptysis; No shortness of breath  CARDIOVASCULAR: No chest pain or palpitations; No lower extremity edema  GASTROINTESTINAL: No abdominal or epigastric pain. No nausea, vomiting, or hematemesis; No diarrhea or constipation. No melena or hematochezia.  BACK: No back pain  GENITOURINARY: No dysuria, frequency or hematuria  NEUROLOGICAL: No numbness or weakness  SKIN: No itching, burning, rashes, or lesions   All other review of systems is negative unless indicated above.            Current Meds:  acetaminophen     Tablet .. 650 milliGRAM(s) Oral every 6 hours PRN  aluminum hydroxide/magnesium hydroxide/simethicone Suspension 30 milliLiter(s) Oral every 4 hours PRN  amLODIPine   Tablet 5 milliGRAM(s) Oral once  atorvastatin 40 milliGRAM(s) Oral at bedtime  clotrimazole 1% Cream 1 Application(s) Topical every 12 hours  dextrose 50% Injectable 25 Gram(s) IV Push once  dextrose 50% Injectable 12.5 Gram(s) IV Push once  dextrose 50% Injectable 25 Gram(s) IV Push once  dextrose Oral Gel 15 Gram(s) Oral once  glucagon  Injectable 1 milliGRAM(s) IntraMuscular once  heparin   Injectable 5000 Unit(s) SubCutaneous every 8 hours  influenza   Vaccine 0.5 milliLiter(s) IntraMuscular once  insulin glargine Injectable (LANTUS) 24 Unit(s) SubCutaneous at bedtime  insulin lispro (ADMELOG) corrective regimen sliding scale   SubCutaneous three times a day before meals  insulin lispro (ADMELOG) corrective regimen sliding scale   SubCutaneous at bedtime  insulin lispro Injectable (ADMELOG) 8 Unit(s) SubCutaneous three times a day before meals  lisinopril 40 milliGRAM(s) Oral daily  melatonin 3 milliGRAM(s) Oral at bedtime PRN  ondansetron Injectable 4 milliGRAM(s) IV Push every 8 hours PRN  potassium chloride    Tablet ER 40 milliEquivalent(s) Oral every 4 hours      Vitals:  T(F): 97.9 (01-26), Max: 98.8 (01-25)  HR: 75 (01-26) (75 - 84)  BP: 154/101 (01-26) (154/101 - 166/110)  RR: 18 (01-26)  SpO2: 95% (01-26)  I&O's Summary    25 Jan 2024 07:01  -  26 Jan 2024 07:00  --------------------------------------------------------  IN: 260 mL / OUT: 0 mL / NET: 260 mL        Physical Exam:  Appearance: No acute distress; well appearing  Eyes: PERRL, EOMI, pink conjunctiva  HEENT: Normal oral mucosa  Cardiovascular: RRR, S1, S2, no murmurs, rubs, or gallops; no edema; no JVD  Respiratory: Clear to auscultation bilaterally  Gastrointestinal: soft, non-tender, non-distended with normal bowel sounds  Musculoskeletal: No clubbing; no joint deformity   Neurologic: Non-focal  Lymphatic: No lymphadenopathy  Psychiatry: AAOx3, mood & affect appropriate  Skin: No rashes, ecchymoses, or cyanosis                          14.7   6.94  )-----------( 253      ( 26 Jan 2024 06:52 )             44.2     01-26    135  |  96  |  20  ----------------------------<  279<H>  3.1<L>   |  24  |  0.70    Ca    9.6      26 Jan 2024 06:52  Phos  3.8     01-26  Mg     2.0     01-26    TPro  7.3  /  Alb  3.7  /  TBili  0.6  /  DBili  x   /  AST  392<H>  /  ALT  284<H>  /  AlkPhos  132<H>  01-26    PT/INR - ( 26 Jan 2024 06:52 )   PT: 12.6 sec;   INR: 1.15 ratio         PTT - ( 26 Jan 2024 06:52 )  PTT:32.1 sec          TTE 1/25  CONCLUSIONS:   1. Left ventricular cavity is small. Left ventricular wall thickness is normal. Left ventricular systolic function is normal. There are no regional wall motion abnormalities seen.   2. Normal left ventricular diastolic function, with normal filling pressure.   3. Mild left ventricular hypertrophy.   4. Normal right ventricular cavity size, wall thickness, and normal systolic function.   5. No pericardial effusion seen.   6. Compared to the transthoracic echocardiogram performed on 8/30/2022, there have been no significant interval changes. No prior echocardiogram is available for comparison.

## 2024-01-26 NOTE — DISCHARGE NOTE PROVIDER - NSDCMRMEDTOKEN_GEN_ALL_CORE_FT
amLODIPine 5 mg oral tablet: 1 tab(s) orally every 24 hours  Basaglar KwikPen 100 units/mL subcutaneous solution: 24 unit(s) subcutaneous once a day  glucometer (per patient&#x27;s insurance): Test blood sugars four times a day. Dispense #1 glucometer.  Insulin Pen Needles, 4mm: 1 application subcutaneously 4 times a day. ** Use with insulin pen **  lancets: 1 application subcutaneously 4 times a day  Lipitor 20 mg oral tablet: 2 tab(s) orally once a day  lisinopril 40 mg oral tablet: 1 tab(s) orally once a day  metFORMIN 500 mg oral tablet: 1 tab(s) orally 2 times a day (with meals) for one week. If tolerating on day 8, increase to two tablets (1000 mG) twice a day (with meals).  metFORMIN 500 mg oral tablet: 1 tab(s) orally 2 times a day (with meals) for one week. If tolerating on day 8, increase to two tablets (1000 mG) twice a day (with meals).  Prandin 1 mg oral tablet: 1 tab(s) orally 3 times a day  test strips (per patient&#x27;s insurance): 1 application subcutaneously 4 times a day. ** Compatible with patient&#x27;s glucometer **   amLODIPine 5 mg oral tablet: 1 tab(s) orally every 24 hours  Basaglar KwikPen 100 units/mL subcutaneous solution: 28 unit(s) subcutaneous once a day  Lipitor 20 mg oral tablet: 2 tab(s) orally once a day  lisinopril 40 mg oral tablet: 1 tab(s) orally once a day  metFORMIN 500 mg oral tablet: 1 tab(s) orally 2 times a day (with meals) for one week. If tolerating on day 8, increase to two tablets (1000 mG) twice a day (with meals).  Prandin 1 mg oral tablet: 1 tab(s) orally 3 times a day

## 2024-01-27 ENCOUNTER — TRANSCRIPTION ENCOUNTER (OUTPATIENT)
Age: 49
End: 2024-01-27

## 2024-01-27 VITALS
RESPIRATION RATE: 18 BRPM | OXYGEN SATURATION: 98 % | DIASTOLIC BLOOD PRESSURE: 96 MMHG | HEART RATE: 74 BPM | SYSTOLIC BLOOD PRESSURE: 146 MMHG | TEMPERATURE: 98 F

## 2024-01-27 LAB
C PEPTIDE SERPL-MCNC: 1.5 NG/ML — SIGNIFICANT CHANGE UP (ref 1.1–4.4)
GLUCOSE BLDC GLUCOMTR-MCNC: 196 MG/DL — HIGH (ref 70–99)
GLUCOSE BLDC GLUCOMTR-MCNC: 212 MG/DL — HIGH (ref 70–99)
GLUCOSE BLDC GLUCOMTR-MCNC: 213 MG/DL — HIGH (ref 70–99)

## 2024-01-27 PROCEDURE — 99285 EMERGENCY DEPT VISIT HI MDM: CPT

## 2024-01-27 PROCEDURE — 87591 N.GONORRHOEAE DNA AMP PROB: CPT

## 2024-01-27 PROCEDURE — C8929: CPT

## 2024-01-27 PROCEDURE — 85730 THROMBOPLASTIN TIME PARTIAL: CPT

## 2024-01-27 PROCEDURE — 84681 ASSAY OF C-PEPTIDE: CPT

## 2024-01-27 PROCEDURE — 99233 SBSQ HOSP IP/OBS HIGH 50: CPT

## 2024-01-27 PROCEDURE — 81001 URINALYSIS AUTO W/SCOPE: CPT

## 2024-01-27 PROCEDURE — 87086 URINE CULTURE/COLONY COUNT: CPT

## 2024-01-27 PROCEDURE — 82435 ASSAY OF BLOOD CHLORIDE: CPT

## 2024-01-27 PROCEDURE — 85025 COMPLETE CBC W/AUTO DIFF WBC: CPT

## 2024-01-27 PROCEDURE — 36415 COLL VENOUS BLD VENIPUNCTURE: CPT

## 2024-01-27 PROCEDURE — 96374 THER/PROPH/DIAG INJ IV PUSH: CPT

## 2024-01-27 PROCEDURE — 83605 ASSAY OF LACTIC ACID: CPT

## 2024-01-27 PROCEDURE — 80053 COMPREHEN METABOLIC PANEL: CPT

## 2024-01-27 PROCEDURE — 84295 ASSAY OF SERUM SODIUM: CPT

## 2024-01-27 PROCEDURE — 84484 ASSAY OF TROPONIN QUANT: CPT

## 2024-01-27 PROCEDURE — 82947 ASSAY GLUCOSE BLOOD QUANT: CPT

## 2024-01-27 PROCEDURE — 76705 ECHO EXAM OF ABDOMEN: CPT

## 2024-01-27 PROCEDURE — 84443 ASSAY THYROID STIM HORMONE: CPT

## 2024-01-27 PROCEDURE — 85014 HEMATOCRIT: CPT

## 2024-01-27 PROCEDURE — 82330 ASSAY OF CALCIUM: CPT

## 2024-01-27 PROCEDURE — 84132 ASSAY OF SERUM POTASSIUM: CPT

## 2024-01-27 PROCEDURE — 82010 KETONE BODYS QUAN: CPT

## 2024-01-27 PROCEDURE — 99232 SBSQ HOSP IP/OBS MODERATE 35: CPT

## 2024-01-27 PROCEDURE — 85610 PROTHROMBIN TIME: CPT

## 2024-01-27 PROCEDURE — 83735 ASSAY OF MAGNESIUM: CPT

## 2024-01-27 PROCEDURE — 86900 BLOOD TYPING SEROLOGIC ABO: CPT

## 2024-01-27 PROCEDURE — 86780 TREPONEMA PALLIDUM: CPT

## 2024-01-27 PROCEDURE — 93005 ELECTROCARDIOGRAM TRACING: CPT

## 2024-01-27 PROCEDURE — 87491 CHLMYD TRACH DNA AMP PROBE: CPT

## 2024-01-27 PROCEDURE — 80061 LIPID PANEL: CPT

## 2024-01-27 PROCEDURE — 71045 X-RAY EXAM CHEST 1 VIEW: CPT

## 2024-01-27 PROCEDURE — 86850 RBC ANTIBODY SCREEN: CPT

## 2024-01-27 PROCEDURE — 84100 ASSAY OF PHOSPHORUS: CPT

## 2024-01-27 PROCEDURE — 83036 HEMOGLOBIN GLYCOSYLATED A1C: CPT

## 2024-01-27 PROCEDURE — 82803 BLOOD GASES ANY COMBINATION: CPT

## 2024-01-27 PROCEDURE — 85018 HEMOGLOBIN: CPT

## 2024-01-27 PROCEDURE — 86901 BLOOD TYPING SEROLOGIC RH(D): CPT

## 2024-01-27 PROCEDURE — 86703 HIV-1/HIV-2 1 RESULT ANTBDY: CPT

## 2024-01-27 PROCEDURE — 82962 GLUCOSE BLOOD TEST: CPT

## 2024-01-27 RX ORDER — INSULIN GLARGINE 100 [IU]/ML
28 INJECTION, SOLUTION SUBCUTANEOUS
Qty: 1 | Refills: 0
Start: 2024-01-27 | End: 2024-02-25

## 2024-01-27 RX ORDER — INSULIN GLARGINE 100 [IU]/ML
28 INJECTION, SOLUTION SUBCUTANEOUS AT BEDTIME
Refills: 0 | Status: DISCONTINUED | OUTPATIENT
Start: 2024-01-27 | End: 2024-01-27

## 2024-01-27 RX ORDER — INSULIN LISPRO 100/ML
12 VIAL (ML) SUBCUTANEOUS
Refills: 0 | Status: DISCONTINUED | OUTPATIENT
Start: 2024-01-27 | End: 2024-01-27

## 2024-01-27 RX ADMIN — Medication 1 APPLICATION(S): at 05:14

## 2024-01-27 RX ADMIN — HEPARIN SODIUM 5000 UNIT(S): 5000 INJECTION INTRAVENOUS; SUBCUTANEOUS at 05:14

## 2024-01-27 RX ADMIN — Medication 12 UNIT(S): at 13:48

## 2024-01-27 RX ADMIN — Medication 1: at 13:30

## 2024-01-27 RX ADMIN — Medication 10 UNIT(S): at 09:03

## 2024-01-27 RX ADMIN — Medication 2: at 09:04

## 2024-01-27 RX ADMIN — LISINOPRIL 40 MILLIGRAM(S): 2.5 TABLET ORAL at 05:14

## 2024-01-27 RX ADMIN — AMLODIPINE BESYLATE 5 MILLIGRAM(S): 2.5 TABLET ORAL at 05:15

## 2024-01-27 NOTE — PROGRESS NOTE ADULT - REASON FOR ADMISSION
hypertensive crisis
HTN urgency
Diabetes with hyperglycemia and HTN
Diabetes with hyperglycemia and HTN

## 2024-01-27 NOTE — PROGRESS NOTE ADULT - PROVIDER SPECIALTY LIST ADULT
Hospitalist
Hospitalist
[facility]
Cardiology
Cardiology
Endocrinology
[facility]
Endocrinology
Hospitalist

## 2024-01-27 NOTE — PROGRESS NOTE ADULT - SUBJECTIVE AND OBJECTIVE BOX
DIABETES FOLLOW UP NOTE: Saw pt earlier today    Chief Complaint: Endocrine consult requested for management of T2DM    INTERVAL HX: Pt stable, reports tolerating POs with BG levels still >200s this am while on present insulin doses. No hypoglycemia. Reports good appetite, no nausea/vomiting.       Review of Systems:  General: As above  Cardiovascular: No chest pain, palpitations  Respiratory: No SOB, no cough  GI: No nausea, vomiting, abdominal pain  Endocrine: No polyuria, polydipsia or S&Sx of hypoglycemia    Allergies    No Known Allergies    Intolerances      MEDICATIONS:  atorvastatin 40 milliGRAM(s) Oral at bedtime  clotrimazole 1% Cream 1 Application(s) Topical every 12 hours  insulin glargine Injectable (LANTUS) 24 Unit(s) SubCutaneous at bedtime  insulin lispro (ADMELOG) corrective regimen sliding scale   SubCutaneous three times a day before meals  insulin lispro (ADMELOG) corrective regimen sliding scale   SubCutaneous at bedtime  insulin lispro Injectable (ADMELOG) 8 Unit(s) SubCutaneous three times a day before meals      PHYSICAL EXAM:  VITALS: T(C): 36.6 (01-26-24 @ 04:13)  T(F): 97.9 (01-26-24 @ 04:13), Max: 98.8 (01-25-24 @ 22:09)  HR: 75 (01-26-24 @ 04:13) (75 - 84)  BP: 154/101 (01-26-24 @ 04:13) (154/101 - 166/110)  RR:  (18 - 18)  SpO2:  (95% - 97%)  Wt(kg): --  GENERAL: Male sitting at edge of bed  in NAD  Abdomen: Soft, nontender, non distended, central adiposity  Extremities: Warm, no edema in all 4 exts  NEURO: A&O X3. Encounter done in [place]    LABS:  CAPILLARY BLOOD GLUCOSE      POCT Blood Glucose.: 196 mg/dL (27 Jan 2024 12:39)  POCT Blood Glucose.: 212 mg/dL (27 Jan 2024 08:36)  POCT Blood Glucose.: 213 mg/dL (27 Jan 2024 01:51)  POCT Blood Glucose.: 226 mg/dL (26 Jan 2024 22:00)  POCT Blood Glucose.: 278 mg/dL (26 Jan 2024 17:15)                            14.7   6.94  )-----------( 253      ( 26 Jan 2024 06:52 )             44.2       01-26    135  |  96  |  20  ----------------------------<  279<H>  3.1<L>   |  24  |  0.70    eGFR: 115    Ca    9.6      01-26  Mg     2.0     01-26  Phos  3.8     01-26    TPro  7.3  /  Alb  3.7  /  TBili  0.6  /  DBili  x   /  AST  392<H>  /  ALT  284<H>  /  AlkPhos  132<H>  01-26      Thyroid Function Tests:  01-25 @ 00:39 TSH 1.59 FreeT4 -- T3 -- Anti TPO -- Anti Thyroglobulin Ab -- TSI --      A1C with Estimated Average Glucose Result: 10.1 % (01-25-24 @ 01:34)  A1C with Estimated Average Glucose Result: 10.3 % (01-25-24 @ 00:38)      Estimated Average Glucose: 243 mg/dL (01-25-24 @ 01:34)  Estimated Average Glucose: 249 mg/dL (01-25-24 @ 00:38)        01-25 Chol 315<H> Direct LDL -- LDL calculated 200<H> HDL 35<L> Trig 392<H>

## 2024-01-27 NOTE — PROGRESS NOTE ADULT - PROBLEM SELECTOR PLAN 2
Goal BP < 130/80   Manage per primary team  Check for secondary causes from endo standpoint such as aldosterone, PRA, plasma metanephrines. Can screen for cushing's as outpatient as suspicion is low.
BP was 250/150 on admission   Got HCTZ, amlodipine, labetalol 1/24   BP improved w/o cardene gtt, lisinopril 40mg due to start 1/26 as to not drop BP too quickly
Goal BP < 130/80   Manage per primary team  Check for secondary causes from endo standpoint such as aldosterone, PRA, plasma metanephrines. Can screen for cushing's as outpatient as suspicion is low.
BP was 250/150 on admission   Got HCTZ, amlodipine, labetalol 1/24   BP improved w/o cardene gtt, lisinopril 40mg due to start 1/26 as to not drop BP too quickly
BP was 250/150 on admission   Got HCTZ, amlodipine, labetalol 1/24   BP improved w/o cardene gtt, lisinopril 40mg due to start 1/26 as to not drop BP too quickly

## 2024-01-27 NOTE — PROGRESS NOTE ADULT - SUBJECTIVE AND OBJECTIVE BOX
[facility] Division of Hospital Medicine  Laurita Fajardo MD  Available on TEAMS 8a-8p      Patient is a 48y old  Male who presents with a chief complaint of hypertensive crisis       SUBJECTIVE / OVERNIGHT EVENTS: No acute events    MEDICATIONS  (STANDING):  amLODIPine   Tablet 5 milliGRAM(s) Oral every 24 hours  atorvastatin 40 milliGRAM(s) Oral at bedtime  clotrimazole 1% Cream 1 Application(s) Topical every 12 hours  dextrose 50% Injectable 25 Gram(s) IV Push once  dextrose 50% Injectable 25 Gram(s) IV Push once  dextrose 50% Injectable 12.5 Gram(s) IV Push once  dextrose Oral Gel 15 Gram(s) Oral once  glucagon  Injectable 1 milliGRAM(s) IntraMuscular once  heparin   Injectable 5000 Unit(s) SubCutaneous every 8 hours  influenza   Vaccine 0.5 milliLiter(s) IntraMuscular once  insulin glargine Injectable (LANTUS) 28 Unit(s) SubCutaneous at bedtime  insulin lispro (ADMELOG) corrective regimen sliding scale   SubCutaneous <User Schedule>  insulin lispro (ADMELOG) corrective regimen sliding scale   SubCutaneous three times a day before meals  insulin lispro Injectable (ADMELOG) 12 Unit(s) SubCutaneous three times a day before meals  lisinopril 40 milliGRAM(s) Oral daily    MEDICATIONS  (PRN):  acetaminophen     Tablet .. 650 milliGRAM(s) Oral every 6 hours PRN Temp greater or equal to 38C (100.4F), Moderate Pain (4 - 6)  aluminum hydroxide/magnesium hydroxide/simethicone Suspension 30 milliLiter(s) Oral every 4 hours PRN Dyspepsia  melatonin 3 milliGRAM(s) Oral at bedtime PRN Insomnia  ondansetron Injectable 4 milliGRAM(s) IV Push every 8 hours PRN Nausea and/or Vomiting        PHYSICAL EXAM:  Vital Signs Last 24 Hrs  T(C): 36.9 (27 Jan 2024 11:20), Max: 37.2 (26 Jan 2024 21:30)  T(F): 98.4 (27 Jan 2024 11:20), Max: 98.9 (26 Jan 2024 21:30)  HR: 74 (27 Jan 2024 11:20) (74 - 82)  BP: 146/96 (27 Jan 2024 11:20) (138/97 - 154/108)  BP(mean): 113 (27 Jan 2024 11:20) (113 - 113)  RR: 18 (27 Jan 2024 11:20) (18 - 18)  SpO2: 98% (27 Jan 2024 11:20) (96% - 98%)    Parameters below as of 27 Jan 2024 04:49  Patient On (Oxygen Delivery Method): room air      CONSTITUTIONAL: NAD, well-developed, well-groomed  EYES: PERRL; conjunctiva and sclera clear  ENMT: Moist oral mucosa  RESPIRATORY: Normal respiratory effort; lungs are clear to auscultation bilaterally  CARDIOVASCULAR: Regular rate and rhythm, normal S1 and S2; No lower extremity edema  ABDOMEN: Nontender to palpation, normoactive bowel sounds, no rebound/guarding  MUSCULOSKELETAL: no clubbing or cyanosis of digits; no joint swelling or tenderness to palpation  PSYCH: A+O to person, place, and time; affect appropriate  NEUROLOGY: CN 2-12 are intact and symmetric; no gross sensory deficits   SKIN: No rashes; no palpable lesions    LABS: reviewed                         14.7   6.94  )-----------( 253      ( 26 Jan 2024 06:52 )             44.2       01-26    135  |  96  |  20  ----------------------------<  279<H>  3.1<L>   |  24  |  0.70    Ca    9.6      26 Jan 2024 06:52  Phos  3.8     01-26  Mg     2.0     01-26    TPro  7.3  /  Alb  3.7  /  TBili  0.6  /  DBili  x   /  AST  392<H>  /  ALT  284<H>  /  AlkPhos  132<H>  01-26              Urinalysis Basic - ( 26 Jan 2024 06:52 )    Color: x / Appearance: x / SG: x / pH: x  Gluc: 279 mg/dL / Ketone: x  / Bili: x / Urobili: x   Blood: x / Protein: x / Nitrite: x   Leuk Esterase: x / RBC: x / WBC x   Sq Epi: x / Non Sq Epi: x / Bacteria: x        PT/INR - ( 26 Jan 2024 06:52 )   PT: 12.6 sec;   INR: 1.15 ratio         PTT - ( 26 Jan 2024 06:52 )  PTT:32.1 sec          CAPILLARY BLOOD GLUCOSE      POCT Blood Glucose.: 196 mg/dL (27 Jan 2024 12:39)

## 2024-01-27 NOTE — PROGRESS NOTE ADULT - PROBLEM SELECTOR PROBLEM 3
Hyperlipidemia
Candida infection of genital region
Candida infection of genital region
Hyperlipidemia
Candida infection of genital region

## 2024-01-27 NOTE — PROGRESS NOTE ADULT - PROBLEM SELECTOR PLAN 1
-Test BG ac and hs and add 2am  -Increase Lantus 28 units qhs for now  -Increase Admelog to 12 units qac  -C/w Low correction scale qac + bedtime and add 2am scale as well  -Needs RD consult  -C-peptide not ordered.  -Will follow  -Please teach and allow pt to do own finger sticks and insulin injections under RN supervision  Please teach use of insulin pen  Please document teach back. Spoke to RN and clinical pharmacy  Discharge:  Plan to d/c on basal + orals depending on insulin requirements. Likely Basaglar plus Prandin. If being discharged today - Basaglar 28u HS and Prandin 2 mg AC meals.   -Can benefit from Metformin as out pt. Needs to f/u LFT (elevated) and pt w/h/o ETOH abuse.    Pt is uninsured/ unemployed > SW getting insulin [facility]  Please write Rxs for: Solo Star Insulin pen/Beti insulin pen needles/glucose meter/strips/lancets   -Will need home care upon discharge due to new DM diagnosis and new insulin therapy  Outpt. endo follow-up. Will email endo clinic for f/u apt  Outpt. optho, podiatry, nephrology

## 2024-01-27 NOTE — PROGRESS NOTE ADULT - SUBJECTIVE AND OBJECTIVE BOX
Denies any acute complaints     REVIEW OF SYSTEMS:  CONSTITUTIONAL: No weakness, fevers or chills  EYES/ENT: No visual changes;  No dysphagia  NECK: No pain or stiffness  RESPIRATORY: No cough, wheezing, hemoptysis; No shortness of breath  CARDIOVASCULAR: No chest pain or palpitations; No lower extremity edema  GASTROINTESTINAL: No abdominal or epigastric pain. No nausea, vomiting, or hematemesis; No diarrhea or constipation. No melena or hematochezia.  BACK: No back pain  GENITOURINARY: No dysuria, frequency or hematuria  NEUROLOGICAL: No numbness or weakness  SKIN: No itching, burning, rashes, or lesions   All other review of systems is negative unless indicated above.            Current Meds:  acetaminophen     Tablet .. 650 milliGRAM(s) Oral every 6 hours PRN  aluminum hydroxide/magnesium hydroxide/simethicone Suspension 30 milliLiter(s) Oral every 4 hours PRN  amLODIPine   Tablet 5 milliGRAM(s) Oral once  atorvastatin 40 milliGRAM(s) Oral at bedtime  clotrimazole 1% Cream 1 Application(s) Topical every 12 hours  dextrose 50% Injectable 25 Gram(s) IV Push once  dextrose 50% Injectable 12.5 Gram(s) IV Push once  dextrose 50% Injectable 25 Gram(s) IV Push once  dextrose Oral Gel 15 Gram(s) Oral once  glucagon  Injectable 1 milliGRAM(s) IntraMuscular once  heparin   Injectable 5000 Unit(s) SubCutaneous every 8 hours  influenza   Vaccine 0.5 milliLiter(s) IntraMuscular once  insulin glargine Injectable (LANTUS) 24 Unit(s) SubCutaneous at bedtime  insulin lispro (ADMELOG) corrective regimen sliding scale   SubCutaneous three times a day before meals  insulin lispro (ADMELOG) corrective regimen sliding scale   SubCutaneous at bedtime  insulin lispro Injectable (ADMELOG) 8 Unit(s) SubCutaneous three times a day before meals  lisinopril 40 milliGRAM(s) Oral daily  melatonin 3 milliGRAM(s) Oral at bedtime PRN  ondansetron Injectable 4 milliGRAM(s) IV Push every 8 hours PRN  potassium chloride    Tablet ER 40 milliEquivalent(s) Oral every 4 hours      Vitals:  T(F): 97.9 (01-26), Max: 98.8 (01-25)  HR: 75 (01-26) (75 - 84)  BP: 154/101 (01-26) (154/101 - 166/110)  RR: 18 (01-26)  SpO2: 95% (01-26)  I&O's Summary    25 Jan 2024 07:01  -  26 Jan 2024 07:00  --------------------------------------------------------  IN: 260 mL / OUT: 0 mL / NET: 260 mL        Physical Exam:  Appearance: No acute distress; well appearing  Eyes: PERRL, EOMI, pink conjunctiva  HEENT: Normal oral mucosa  Cardiovascular: RRR, S1, S2, no murmurs, rubs, or gallops; no edema; no JVD  Respiratory: Clear to auscultation bilaterally  Gastrointestinal: soft, non-tender, non-distended with normal bowel sounds  Musculoskeletal: No clubbing; no joint deformity   Neurologic: Non-focal  Lymphatic: No lymphadenopathy  Psychiatry: AAOx3, mood & affect appropriate  Skin: No rashes, ecchymoses, or cyanosis                          14.7   6.94  )-----------( 253      ( 26 Jan 2024 06:52 )             44.2     01-26    135  |  96  |  20  ----------------------------<  279<H>  3.1<L>   |  24  |  0.70    Ca    9.6      26 Jan 2024 06:52  Phos  3.8     01-26  Mg     2.0     01-26    TPro  7.3  /  Alb  3.7  /  TBili  0.6  /  DBili  x   /  AST  392<H>  /  ALT  284<H>  /  AlkPhos  132<H>  01-26    PT/INR - ( 26 Jan 2024 06:52 )   PT: 12.6 sec;   INR: 1.15 ratio         PTT - ( 26 Jan 2024 06:52 )  PTT:32.1 sec          TTE 1/25  CONCLUSIONS:   1. Left ventricular cavity is small. Left ventricular wall thickness is normal. Left ventricular systolic function is normal. There are no regional wall motion abnormalities seen.   2. Normal left ventricular diastolic function, with normal filling pressure.   3. Mild left ventricular hypertrophy.   4. Normal right ventricular cavity size, wall thickness, and normal systolic function.   5. No pericardial effusion seen.   6. Compared to the transthoracic echocardiogram performed on 8/30/2022, there have been no significant interval changes. No prior echocardiogram is available for comparison.        Assessment and Plan:   · Assessment	  48M with PMHx of HTN and DM presenting for 1 week of penile dysuria w/ discharge, found to be in hypertensive urgency/emergency, now s/p nicardipine gtt, also newly found to have DM2.      REC:  #HTN emergency -BP remains poorly controlled. Will need additional anti-hypertensives. BP goal <130/90  - continue lisinopril 40mg daily  - start amlodipine 5mg daily  - can likely discharge today with PCP follow up for management of HTN, DM   - will need BP machine Rx on discharge to monitor at home   - stressed with patient importance of compliance with meds and regular f/u with his M.DJavier Dove                                                                                                                                                                                                                                Forty-1 minutes spent in patient care management

## 2024-01-27 NOTE — PROGRESS NOTE ADULT - NSPROGADDITIONALINFOA_GEN_ALL_CORE
brodie Hoover
-Plan discussed with pt/team.  Contact info: [phone] (24/7). pager [phone]  Amion on [employer]  Teams on M-T-W-F. Unavailable Thu/Weekends/Holidays  Provided face to face education which was more than 50% of encounter that also included assessing pt/labs/meds and discussing plan of care with primary team.  Adjusting insulin  Discharge plan  Follow up care
Stable for discharge to home today  Discharge time: 46 minutes
Stable for discharge to home today  Discharge time: 46 minutes    D/W wife at bedside

## 2024-01-27 NOTE — DISCHARGE NOTE NURSING/CASE MANAGEMENT/SOCIAL WORK - NSDCPEFALRISK_GEN_ALL_CORE
For information on Fall & Injury Prevention, visit: [email or web address] OR  [email or web address] OR  https://www.cdc.gov/steadi/patient.html

## 2024-01-27 NOTE — PROGRESS NOTE ADULT - NUTRITIONAL ASSESSMENT
Diet, Consistent Carbohydrate/No Snacks:   DASH/TLC {Sodium & Cholesterol Restricted} (DASH) (01-25-24 @ 07:50) [Active]      Needs RD consult
Diet, Consistent Carbohydrate/No Snacks:   DASH/TLC {Sodium & Cholesterol Restricted} (DASH) (01-25-24 @ 07:50) [Active]      Needs RD consult

## 2024-01-27 NOTE — PROGRESS NOTE ADULT - ASSESSMENT
49 y/o M w/ newly diagnosed uncontrolled Type 2 DM (10.3%), uncontrolled/ untreated HTN and new hyperlipidemia. Here with c/o penile discharge found  to have hypertensive urgency. Tolerating POs with BG levels still >200s. Will continue to adjust insulin to BG goal 100 to 180s. No hypoglycemia.  (high risk patient with severe hyperglycemia with A1c >10% at high risk of CAD and CVA with high level complexity and high level decision-making).       -New DM/HTN/HLD diagnosis and importance of tx and f/u  -Blood glucose goals: 100s to 150s as out pt  -Glucose monitoring frequency: ac and hs  -Healthy eating and portion control. My Plate reviewed in depth  -ETOH and DM  -Insulin(s) action, time of administration and side effects. Basal insulin need plus oral meds.   -Importance of follow up care. Uninsured> pt to f/u at endo clinic  Pt able to verbalize understanding regarding the need for glucose monitoring, diet, DM meds and follow up care. All questions answered. Pt somewhat afraid of injecting insulin> spoke to RN to start teaching

## 2024-01-27 NOTE — DISCHARGE NOTE NURSING/CASE MANAGEMENT/SOCIAL WORK - PATIENT PORTAL LINK FT
You can access the FollowMyHealth Patient Portal offered by [facility] by registering at the following website: [email or web address]. By joining [employer]’s FollowMyHealth portal, you will also be able to view your health information using other applications (apps) compatible with our system.

## 2024-01-27 NOTE — PROGRESS NOTE ADULT - PROBLEM SELECTOR PLAN 3
With some improvement after mycelex topical but still erythematous and BG very uncontrolled which is likely etiology  ordered fluconazole 150mg x1, qtc was 441 1/24
With some improvement after mycelex topical but still erythematous and BG very uncontrolled which is likely etiology  ordered fluconazole 150mg x1, qtc was 441 1/24
LDL goal <70 due to DM  Pt   Statin as noted above. F/u levels as out pt extremely high LDL level, consider genetic workup for familial hypercholesterolemia. Optimize statin dose.  C/w low cholesterol diet   Manage per primary team   Glycemic control recommended for hypertriglyceridemia.
With some improvement after mycelex topical but still erythematous and BG very uncontrolled which is likely etiology  ordered fluconazole 150mg x1, qtc was 441 1/24
LDL goal <70 due to DM  Pt   Statin as noted above. F/u levels as out pt extremely high LDL level, consider genetic workup for familial hypercholesterolemia. Optimize statin dose.  C/w low cholesterol diet   Manage per primary team   Glycemic control recommended for hypertriglyceridemia.

## 2024-01-27 NOTE — PROGRESS NOTE ADULT - PROBLEM SELECTOR PROBLEM 2
Hypertensive emergency
Hypertensive urgency
Hypertensive urgency
Hypertensive emergency
Hypertensive emergency

## 2024-01-27 NOTE — PROGRESS NOTE ADULT - PROBLEM SELECTOR PLAN 5
hep subq
hep subq
hep subq    Dispo pending BG control, final endo recs. Possibility of 1/26 d/w patient  No insurance

## 2024-02-27 ENCOUNTER — APPOINTMENT (OUTPATIENT)
Dept: INTERNAL MEDICINE | Facility: CLINIC | Age: 49
End: 2024-02-27
Payer: COMMERCIAL

## 2024-02-27 ENCOUNTER — OUTPATIENT (OUTPATIENT)
Dept: OUTPATIENT SERVICES | Facility: HOSPITAL | Age: 49
LOS: 1 days | End: 2024-02-27
Payer: SELF-PAY

## 2024-02-27 VITALS
SYSTOLIC BLOOD PRESSURE: 150 MMHG | BODY MASS INDEX: 27.62 KG/M2 | DIASTOLIC BLOOD PRESSURE: 90 MMHG | HEART RATE: 95 BPM | OXYGEN SATURATION: 98 % | HEIGHT: 67 IN | WEIGHT: 176 LBS

## 2024-02-27 DIAGNOSIS — Z87.891 PERSONAL HISTORY OF NICOTINE DEPENDENCE: ICD-10-CM

## 2024-02-27 DIAGNOSIS — Z78.9 OTHER SPECIFIED HEALTH STATUS: ICD-10-CM

## 2024-02-27 DIAGNOSIS — K76.0 FATTY (CHANGE OF) LIVER, NOT ELSEWHERE CLASSIFIED: ICD-10-CM

## 2024-02-27 DIAGNOSIS — E11.9 TYPE 2 DIABETES MELLITUS W/OUT COMPLICATIONS: ICD-10-CM

## 2024-02-27 DIAGNOSIS — Z12.11 ENCOUNTER FOR SCREENING FOR MALIGNANT NEOPLASM OF COLON: ICD-10-CM

## 2024-02-27 DIAGNOSIS — Z23 ENCOUNTER FOR IMMUNIZATION: ICD-10-CM

## 2024-02-27 DIAGNOSIS — E78.5 HYPERLIPIDEMIA, UNSPECIFIED: ICD-10-CM

## 2024-02-27 DIAGNOSIS — Z00.00 ENCOUNTER FOR GENERAL ADULT MEDICAL EXAMINATION W/OUT ABNORMAL FINDINGS: ICD-10-CM

## 2024-02-27 DIAGNOSIS — I10 ESSENTIAL (PRIMARY) HYPERTENSION: ICD-10-CM

## 2024-02-27 PROCEDURE — 99386 PREV VISIT NEW AGE 40-64: CPT | Mod: GC

## 2024-02-27 PROCEDURE — T1013: CPT

## 2024-02-27 PROCEDURE — 90677 PCV20 VACCINE IM: CPT

## 2024-02-27 PROCEDURE — G0463: CPT

## 2024-02-27 PROCEDURE — G0009: CPT

## 2024-02-27 RX ORDER — INSULIN GLARGINE 100 [IU]/ML
100 INJECTION, SOLUTION SUBCUTANEOUS
Qty: 1 | Refills: 2 | Status: ACTIVE | COMMUNITY
Start: 2024-02-27 | End: 1900-01-01

## 2024-02-27 RX ORDER — METFORMIN HYDROCHLORIDE 1000 MG/1
1000 TABLET, COATED ORAL
Qty: 90 | Refills: 3 | Status: ACTIVE | COMMUNITY
Start: 2024-02-27 | End: 1900-01-01

## 2024-02-27 RX ORDER — ATORVASTATIN CALCIUM 20 MG/1
20 TABLET, FILM COATED ORAL
Qty: 180 | Refills: 3 | Status: ACTIVE | COMMUNITY
Start: 2024-02-27 | End: 1900-01-01

## 2024-02-27 RX ORDER — DULAGLUTIDE 0.75 MG/.5ML
0.75 INJECTION, SOLUTION SUBCUTANEOUS
Qty: 1 | Refills: 3 | Status: ACTIVE | COMMUNITY
Start: 2024-02-27 | End: 1900-01-01

## 2024-02-28 PROBLEM — Z78.9 SOCIAL ALCOHOL USE: Status: ACTIVE | Noted: 2024-02-28

## 2024-02-28 PROBLEM — Z87.891 FORMER SMOKER: Status: ACTIVE | Noted: 2024-02-28

## 2024-02-28 RX ORDER — REPAGLINIDE 1 MG/1
1 TABLET ORAL 3 TIMES DAILY
Qty: 270 | Refills: 1 | Status: DISCONTINUED | COMMUNITY
Start: 2024-02-27 | End: 2024-02-28

## 2024-02-28 RX ORDER — AMLODIPINE BESYLATE 5 MG/1
5 TABLET ORAL
Qty: 90 | Refills: 2 | Status: DISCONTINUED | COMMUNITY
Start: 2024-02-27 | End: 2024-02-28

## 2024-02-28 RX ORDER — LOSARTAN POTASSIUM 100 MG/1
100 TABLET, FILM COATED ORAL
Qty: 1 | Refills: 0 | Status: DISCONTINUED | COMMUNITY
Start: 2024-02-27 | End: 2024-02-28

## 2024-02-28 RX ORDER — LOSARTAN POTASSIUM AND HYDROCHLOROTHIAZIDE 12.5; 5 MG/1; MG/1
50-12.5 TABLET ORAL
Qty: 180 | Refills: 1 | Status: ACTIVE | COMMUNITY
Start: 2024-02-28 | End: 1900-01-01

## 2024-02-28 NOTE — PHYSICAL EXAM
[No Acute Distress] : no acute distress [Well Developed] : well developed [Normal Sclera/Conjunctiva] : normal sclera/conjunctiva [PERRL] : pupils equal round and reactive to light [EOMI] : extraocular movements intact [Normal Oropharynx] : the oropharynx was normal [No Lymphadenopathy] : no lymphadenopathy [Normal TMs] : both tympanic membranes were normal [Supple] : supple [Thyroid Normal, No Nodules] : the thyroid was normal and there were no nodules present [No Respiratory Distress] : no respiratory distress  [Clear to Auscultation] : lungs were clear to auscultation bilaterally [Normal Rate] : normal rate  [Regular Rhythm] : with a regular rhythm [Normal S1, S2] : normal S1 and S2 [No Edema] : there was no peripheral edema [Soft] : abdomen soft [Non Tender] : non-tender [Non-distended] : non-distended [No CVA Tenderness] : no CVA  tenderness [No Focal Deficits] : no focal deficits [No Spinal Tenderness] : no spinal tenderness [Normal Gait] : normal gait [Normal Affect] : the affect was normal [Normal Insight/Judgement] : insight and judgment were intact

## 2024-02-29 NOTE — INTERPRETER SERVICES
[[employer] ] : provided by [employer]   [Time Spent: ____ minutes] : Total time spent using  services: [unfilled] minutes. The patient's primary language is not English thus required  services. [Interpreters_IDNumber] : [id number] [Interpreters_FullName] : Aga [TWNoteComboBox1] : [place]

## 2024-02-29 NOTE — ASSESSMENT
[FreeTextEntry1] : 49M with PMH T2DM, HTN, HLD, and hepatic steatosis presenting for CPE and to establish care.   #T2DM uncontrolled, a1c 10% in Jan  - FS's mostly ranging 80s-110s, with few symptomatic hypoglycemic episodes and few spikes to 200s - appears pt was enrolled in [facility] during hospitalization, will confirm with pharmacy - lower basaglar to 22 units qhs - continue metformin 1g BID - prandin not covered in RENET[place] - will initiate pt on truclicity  - check urine microalbumin/cr  - was followed by endo inpatient, has appointment scheduled for 4/11  #HTN /90 in office today - amlodipine and lisinopril both not on RENETTA, pt unable to afford either  - will switch to losartan-HCTZ 100-25mg daily   #hepatic steatosis  pt w/ acuite on chronic transaminitis in the hospital, may have been due to hypertensive emergency. repeat  - prior abd US demonstrated hepatomegaly, fatty liver, with irregular contour thats concerning for cirrhosis  - will send for fibroscan  - acute hepatitis panel negative, check for chronic HBV  #HLD with LDL of 200 in Jan - continue atorvastatin 40mg qhs  - repeat lipid panel  #HCM - colon cancer screening due: GI referral ordered  - flu vaccine reportedly UTD - TDAP vaccine reportedly UTD, pt will try to clarify  - pneumonia vaccine: prevnar 20 today   RTC in 5 weeks for DM f/u and uptitrate trulicity as tolerated, should also see ophthalmology, BP check  Case discussed w/ Dr. Child,  Lakhwinder Davila, PGY3

## 2024-02-29 NOTE — HISTORY OF PRESENT ILLNESS
[FreeTextEntry1] : CPE [de-identified] : 49M with PMH T2DM, HTN, HLD, and hepatic steatosis presenting for CPE and to establish care.   Pt had presented to [facility] ED for penile pain and was found to have BP of 247/150 and hyperglycemic to >500. Was admitted to CCU for hypertensive crisis, although appears BP quickly improved and was shortly downgraded. Was also found to have uncontrolled T2DM w/ a1c of 10% for which endocrine was following. Developed worsening acute on chronic transaminitis. Was treated for candidal balanitis.  Pt was discharged on:  - amlodipine 5mg qd - prandin 1mg TID - metformin 1g BID - basaglar 28 units qhs - lipitor 40mg qhs - lisinopril 40mg qd   For his DM, pt has been checking fasting FS's on most days. Mostly ranging in 80s-110s but with few hypoglycemic episodes associated with dizziness down to 50s-70s that prompted him to lower his basaglar from 28 units to 24 units. With less hypoglycemia but still a couple. also with some spikes up to 200s.   Pt does not have insurance and is currently unemployed, only his wife is working, anticipiates that his landlord will raise rent soon and is worried about affording medications.

## 2024-02-29 NOTE — END OF VISIT
[] : Resident [FreeTextEntry3] : Pt outline as above. Note multiple med changes to accommodate RENETTA use, feel this is the best option to avoid financial strain re: meds.

## 2024-02-29 NOTE — REVIEW OF SYSTEMS
[Fever] : no fever [Chills] : no chills [Discharge] : no discharge [Vision Problems] : no vision problems [Earache] : no earache [Nasal Discharge] : no nasal discharge [Chest Pain] : no chest pain [Palpitations] : no palpitations [Shortness Of Breath] : no shortness of breath [Cough] : no cough [Abdominal Pain] : no abdominal pain [Vomiting] : no vomiting [Dysuria] : no dysuria [Hematuria] : no hematuria [Joint Pain] : no joint pain [Back Pain] : no back pain [Itching] : no itching [Skin Rash] : no skin rash [Headache] : no headache [Dizziness] : no dizziness

## 2024-03-02 ENCOUNTER — EMERGENCY (EMERGENCY)
Facility: HOSPITAL | Age: 49
LOS: 1 days | Discharge: HOME | End: 2024-03-02
Attending: EMERGENCY MEDICINE
Payer: MEDICAID

## 2024-03-02 VITALS
HEART RATE: 84 BPM | TEMPERATURE: 98 F | HEIGHT: 67 IN | DIASTOLIC BLOOD PRESSURE: 100 MMHG | WEIGHT: 169.98 LBS | SYSTOLIC BLOOD PRESSURE: 174 MMHG | RESPIRATION RATE: 17 BRPM | OXYGEN SATURATION: 99 %

## 2024-03-02 VITALS
TEMPERATURE: 98 F | SYSTOLIC BLOOD PRESSURE: 157 MMHG | DIASTOLIC BLOOD PRESSURE: 83 MMHG | RESPIRATION RATE: 18 BRPM | OXYGEN SATURATION: 99 % | HEART RATE: 84 BPM

## 2024-03-02 PROCEDURE — 99283 EMERGENCY DEPT VISIT LOW MDM: CPT

## 2024-03-02 PROCEDURE — 99282 EMERGENCY DEPT VISIT SF MDM: CPT

## 2024-03-02 NOTE — ED PROVIDER NOTE - CLINICAL SUMMARY MEDICAL DECISION MAKING FREE TEXT BOX
Pt does not have any red flag signs. Upper extremities and lower extremities have equal strength and sensation b/l. Neurologically intact. Low concern for spinal cord compression. most likley muscle soreness from lifting weights.

## 2024-03-02 NOTE — ED PROVIDER NOTE - NSFOLLOWUPINSTRUCTIONS_ED_ALL_ED_FT
Dolor muscular en los adultos  Muscle Pain, Adult  El dolor muscular, también llamado mialgia, es kike afección que causa dolor en madeleine o más músculos del cuerpo. El dolor puede ser leve, moderado o intenso. Puede ser savage, kike molestia continua o kike sensación de ardor. La mayoría de las veces, el dolor dura solo un corto período y desaparece por sí solo.    Es normal sentir algo de dolor muscular después de comenzar un nuevo programa de ejercicios. Los músculos que no se locke usado mucho dolerán al principio.    ¿Cuáles son las causas?  Puede tener dolor muscular cuando usa los músculos de kike manera nueva o diferente después de no haberlos usado saray algún tiempo. El dolor muscular también puede deberse al uso excesivo o al estiramiento de un músculo más allá de kelley longitud normal (distensión muscular). Es más probable que tenga dolor muscular si no está en forma.    Algunas otras causas son las siguientes:  Lesiones o moretones.  Enfermedades infecciosas. Estas incluyen enfermedades causadas por virus, bri la gripe (influenza).  Fibromialgia. Es kike afección a ashu plazo (crónica) que causa sensibilidad muscular, cansancio (fatiga) y dolor de con.  Enfermedades autoinmunes o reumatológicas. Son afecciones, bri el lupus, que hacen que el sistema de defensa del cuerpo (sistema inmunitario) ataque zonas del cuerpo.  Ciertos medicamentos. Estos incluyen los inhibidores de la enzima convertidora de angiotensina (IECA) y estatinas.  ¿Cuáles son los signos o síntomas?  El síntoma principal son los músculos inflamados o doloridos. Los músculos pueden estar doloridos cuando usted realiza actividades y cuando se estira. También puede jerardo kike leve hinchazón.    ¿Cómo se diagnostica?  El dolor muscular se diagnostica mediante un examen físico. El médico le hará preguntas acerca del dolor y cuándo comenzó a sentirlo.  Si no ha tenido dolor muscular saray mucho tiempo, el médico puede esperar antes de hacer diversas pruebas.  Si el dolor muscular ha durado mucho tiempo, se pueden realizar pruebas de inmediato.  En algunos casos, es posible que necesite pruebas para descartar otras afecciones y enfermedades.  ¿Cómo se trata?  El tratamiento del dolor muscular depende de la causa. El cuidado en casa a menudo es suficiente para aliviar el dolor. El médico también puede recetarle antiinflamatorios no esteroideos (ASHLEY), bri ibuprofeno.    Siga estas instrucciones en kelley casa:  Medicamentos    Use los medicamentos de venta anthony y los recetados solamente bri se lo haya indicado el médico.  Pregúntele al médico si el medicamento recetado le impide conducir o usar maquinaria.  Manejo del dolor, la hinchazón y las molestias    Bag of ice on a towel on the skin.  A heating pad for use on the affected area.  Si se lo indican, aplique hielo en la shaneka dolorida saray los primeros 2 días de dolor.  Ponga el hielo en kike bolsa plástica.  Coloque kike toalla entre la piel y la bolsa.  Aplique el hielo saray 20 minutos, 2 a 3 veces por día.  Si la piel se le pone de color real brillante, retire el hielo de inmediato para evitar daños en la piel. El riesgo de daño es mayor si no puede sentir dolor, calor o frío.  Saray los primeros 2 días de dolor muscular, o si hay hinchazón:  No se dé dalia calientes.  No use el jacuzzi, baño cordelia, sauna, almohadillas térmicas ni ninguna otra shasha de calor.  Después de 2 o 3 días, puede cambiar entre aplicar hielo y calor en la shaneka. Si se lo indican, aplique calor en la shaneka afectada con la frecuencia que le haya dicho el médico. Use la shasha de calor que el médico le recomiende, bri kike compresa de calor húmedo o kike almohadilla térmica.  Coloque kike toalla entre la piel y la shasha de calor.  Aplique calor saray 20 a 30 minutos.  Si la piel se le pone de color real brillante, retire el hielo o el calor de inmediato para evitar daños en la piel. El riesgo de daño es mayor si no puede sentir dolor, calor o frío.  Si tiene kike lesión, levante (eleve) la shaneka lesionada por encima del nivel del corazón mientras esté sentado o acostado.  Actividad    A person standing with arms stretched straight out in front and then squatting while keeping the knees over the toes.  Si el dolor muscular se debe al uso excesivo, becca lo siguiente:  Disminuya pat actividades hasta que el dolor desaparezca.  Realice ejercicios suaves y regulares si normalmente no hace actividad física.  Entre en calor antes de hacer ejercicio. Elongue antes y después de hacer el ejercicio. Poynor puede ayudar a disminuir el riesgo de que sufra dolor muscular.  No continúe haciendo actividad física si el dolor es intenso. El dolor intenso podría indicar que se ha lesionado un músculo.  Es posible que deba evitar levantar objetos. Pregúntele al médico cuánto peso puede levantar sin correr riesgos.  Retome pat actividades normales bri se lo haya indicado el médico. Pregúntele al médico qué actividades son seguras para usted.  Instrucciones generales    No consuma ningún producto que contenga nicotina o tabaco. Estos productos incluyen cigarrillos, tabaco para mascar y aparatos de vapeo, bri los cigarrillos electrónicos. Si necesita ayuda para dejar de fumar, consulte al médico.  Comuníquese con un médico si:  El dolor muscular empeora y los medicamentos no surten efecto.  El dolor muscular dura más de 3 días.  Tiene kike erupción cutánea o fiebre.  Tiene dolor muscular después de kike picadura de garrapata.  Tiene dolor muscular al hacer ejercicio, aunque esté en buena forma.  Tiene enrojecimiento dolor o hinchazón.  Tiene dolor muscular después de comenzar un medicamento nuevo o de cambiar la dosis de un medicamento.  Solicite ayuda de inmediato si:  Tiene dificultad para respirar.  Tiene dificultad para tragar.  Tiene dolor muscular junto con rigidez en el janes, fiebre y vómitos.  Tiene debilidad muscular intensa o no puede  kike parte del cuerpo.  Orina menos o tiene orina oscura, con rocael o de color diferente.  Tiene enrojecimiento o hinchazón en el lugar del dolor muscular.  Estos síntomas pueden indicar kike emergencia. Solicite ayuda de inmediato. Llame al 911.  No espere a pb si los síntomas desaparecen.  No conduzca por pat propios medios hasta el hospital.  Esta información no tiene bri fin reemplazar el consejo del médico. Asegúrese de hacerle al médico cualquier pregunta que tenga.

## 2024-03-02 NOTE — ED PROVIDER NOTE - ATTENDING CONTRIBUTION TO CARE
Hx: pt with one week of soreness in biceps and intermittent tingling of arms but none now.  No weakness, no fever, cp, sob.  No midline neck or back pain, has some trapezius located soreness as well.  Has h/o HTN and DM, confused about his medications and asking to go over them.    PE: well appearing, nontoxic, no respiratory distress.  Neuro nonfocal.  Skin intact. Psych normal mood.  5/5 motor UE and LE  Full sensation upper and lower extremities.  No midline c/t/l spine  No rash.    MDM: myalgia secondary to strain, no signs of cord compression, acute cervical nerve impingement or stroke. Education on treatment, also review meds and clarify HTN and DM meds.

## 2024-03-02 NOTE — ED PROVIDER NOTE - PHYSICAL EXAMINATION
GENERAL: NAD  HEENT:  Atraumatic  CHEST/LUNG: Chest rise equal bilaterally  HEART: Regular rate and rhythm  EXTREMITIES:  Extremities warm  PSYCH: A&Ox3  SKIN: No obvious rashes or lesions  MSK: No cervical spine TTP, able to range neck to the left and right  NEUROLOGY: strength and sensation intact in all extremities.

## 2024-03-02 NOTE — ED PROVIDER NOTE - NSICDXFAMILYHX_GEN_ALL_CORE_FT
FAMILY HISTORY:  FHx: type 2 diabetes mellitus    Mother  Still living? Unknown  Family history of stroke, Age at diagnosis: Age Unknown    Sibling  Still living? Unknown  Family history of stroke, Age at diagnosis: Age Unknown

## 2024-03-02 NOTE — ED ADULT NURSE NOTE - OBJECTIVE STATEMENT
48yo M PMH DM, HTN, complains of bilateral upper and lower extremity pain and pain in lateral LLQ and RLQ of abdomen. Patient states pain had gradual onset in past 2 months since previous hospital admission. Patient reports increased abdominal pain during urination, no change in urgency, amount, or frequency, patient producing clear yellow urine. Patient has equal strength and ROM in all extremities, able to ambulate with normal gait, no pain/weakness. Patient states he lifts weights regularly at gym. Patient has not been compliant with medications due to confusion on multiple prescriptions. Patient checks BGL daily in the morning and reports BGL within 80-120s every morning. Patient denies chest pain, SOB, numbness, tingling, incontinence, back pain, falls, injury, N/V/D, fever, chills.

## 2024-03-02 NOTE — ED PROVIDER NOTE - OBJECTIVE STATEMENT
48 y/o male pt pmhx DM2, HTN comes in for upper and lower extremity soreness and numbness x1week. Denies fever, chills, body aches, recent trauma, neck pain, loss of sensation. Pt started lifting weights 1 week ago.

## 2024-03-02 NOTE — ED PROVIDER NOTE - PATIENT PORTAL LINK FT
You can access the FollowMyHealth Patient Portal offered by [facility] by registering at the following website: [email or web address]. By joining [employer]’s FollowMyHealth portal, you will also be able to view your health information using other applications (apps) compatible with our system.

## 2024-03-04 DIAGNOSIS — Z78.9 OTHER SPECIFIED HEALTH STATUS: ICD-10-CM

## 2024-03-04 DIAGNOSIS — Z12.11 ENCOUNTER FOR SCREENING FOR MALIGNANT NEOPLASM OF COLON: ICD-10-CM

## 2024-03-04 DIAGNOSIS — K76.0 FATTY (CHANGE OF) LIVER, NOT ELSEWHERE CLASSIFIED: ICD-10-CM

## 2024-03-04 DIAGNOSIS — E11.9 TYPE 2 DIABETES MELLITUS WITHOUT COMPLICATIONS: ICD-10-CM

## 2024-03-04 DIAGNOSIS — Z23 ENCOUNTER FOR IMMUNIZATION: ICD-10-CM

## 2024-03-04 DIAGNOSIS — Z00.00 ENCOUNTER FOR GENERAL ADULT MEDICAL EXAMINATION WITHOUT ABNORMAL FINDINGS: ICD-10-CM

## 2024-03-04 DIAGNOSIS — E78.5 HYPERLIPIDEMIA, UNSPECIFIED: ICD-10-CM

## 2024-03-04 DIAGNOSIS — Z87.891 PERSONAL HISTORY OF NICOTINE DEPENDENCE: ICD-10-CM

## 2024-04-03 ENCOUNTER — APPOINTMENT (OUTPATIENT)
Dept: INTERNAL MEDICINE | Facility: CLINIC | Age: 49
End: 2024-04-03

## 2024-04-11 ENCOUNTER — APPOINTMENT (OUTPATIENT)
Dept: ENDOCRINOLOGY | Facility: HOSPITAL | Age: 49
End: 2024-04-11

## 2025-05-23 NOTE — ED PROVIDER NOTE - DATE/TIME 1
30-Aug-2022 06:35 Detail Level: Generalized Quality 226: Preventive Care And Screening: Tobacco Use: Screening And Cessation Intervention: Patient screened for tobacco use and is an ex/non-smoker